# Patient Record
Sex: FEMALE | Race: WHITE | Employment: FULL TIME | URBAN - METROPOLITAN AREA
[De-identification: names, ages, dates, MRNs, and addresses within clinical notes are randomized per-mention and may not be internally consistent; named-entity substitution may affect disease eponyms.]

---

## 2018-07-12 LAB
HBSAG, EXTERNAL: NEGATIVE
HIV, EXTERNAL: NEGATIVE
RPR, EXTERNAL: NON REACTIVE
RUBELLA, EXTERNAL: NORMAL
TYPE, ABO & RH, EXTERNAL: NORMAL
URINALYSIS, EXTERNAL: NORMAL

## 2019-02-18 ENCOUNTER — HOSPITAL ENCOUNTER (OUTPATIENT)
Age: 34
Discharge: HOME | End: 2019-02-18
Attending: OBSTETRICS & GYNECOLOGY | Admitting: OBSTETRICS & GYNECOLOGY
Payer: COMMERCIAL

## 2019-02-18 VITALS
HEIGHT: 66 IN | DIASTOLIC BLOOD PRESSURE: 86 MMHG | HEART RATE: 92 BPM | SYSTOLIC BLOOD PRESSURE: 129 MMHG | WEIGHT: 195 LBS | BODY MASS INDEX: 31.34 KG/M2

## 2019-02-18 LAB
ALBUMIN SERPL-MCNC: 2.7 G/DL (ref 3.4–5)
ALBUMIN/GLOB SERPL: 0.8 {RATIO} (ref 0.8–1.7)
ALP SERPL-CCNC: 112 U/L (ref 45–117)
ALT SERPL-CCNC: 14 U/L (ref 13–56)
ANION GAP SERPL CALC-SCNC: 9 MMOL/L (ref 3–18)
APPEARANCE UR: ABNORMAL
AST SERPL-CCNC: 11 U/L (ref 15–37)
BILIRUB SERPL-MCNC: 0.3 MG/DL (ref 0.2–1)
BILIRUB UR QL: NEGATIVE
BUN SERPL-MCNC: 9 MG/DL (ref 7–18)
BUN/CREAT SERPL: 15 (ref 12–20)
CALCIUM SERPL-MCNC: 8.5 MG/DL (ref 8.5–10.1)
CHLORIDE SERPL-SCNC: 107 MMOL/L (ref 100–108)
CO2 SERPL-SCNC: 23 MMOL/L (ref 21–32)
COLOR UR: YELLOW
CREAT SERPL-MCNC: 0.61 MG/DL (ref 0.6–1.3)
ERYTHROCYTE [DISTWIDTH] IN BLOOD BY AUTOMATED COUNT: 14.5 % (ref 11.6–14.5)
GLOBULIN SER CALC-MCNC: 3.2 G/DL (ref 2–4)
GLUCOSE SERPL-MCNC: 69 MG/DL (ref 74–99)
GLUCOSE UR QL STRIP.AUTO: NEGATIVE MG/DL
HCT VFR BLD AUTO: 32.9 % (ref 35–45)
HGB BLD-MCNC: 10.7 G/DL (ref 12–16)
KETONES UR-MCNC: NEGATIVE MG/DL
LDH SERPL L TO P-CCNC: 132 U/L (ref 81–234)
LEUKOCYTE ESTERASE UR QL STRIP: NEGATIVE
MCH RBC QN AUTO: 29.3 PG (ref 24–34)
MCHC RBC AUTO-ENTMCNC: 32.5 G/DL (ref 31–37)
MCV RBC AUTO: 90.1 FL (ref 74–97)
NITRITE UR QL: NEGATIVE
PH UR: 6 [PH] (ref 5–9)
PLATELET # BLD AUTO: 191 K/UL (ref 135–420)
PMV BLD AUTO: 10.2 FL (ref 9.2–11.8)
POTASSIUM SERPL-SCNC: 4 MMOL/L (ref 3.5–5.5)
PROT SERPL-MCNC: 5.9 G/DL (ref 6.4–8.2)
PROT UR QL: NEGATIVE MG/DL
RBC # BLD AUTO: 3.65 M/UL (ref 4.2–5.3)
RBC # UR STRIP: ABNORMAL /UL
SERVICE CMNT-IMP: ABNORMAL
SODIUM SERPL-SCNC: 139 MMOL/L (ref 136–145)
SP GR UR: 1.02 (ref 1–1.02)
URATE SERPL-MCNC: 4.5 MG/DL (ref 2.6–7.2)
UROBILINOGEN UR QL: 0.2 EU/DL (ref 0.2–1)
WBC # BLD AUTO: 9.9 K/UL (ref 4.6–13.2)

## 2019-02-18 PROCEDURE — 74011250637 HC RX REV CODE- 250/637: Performed by: MIDWIFE

## 2019-02-18 PROCEDURE — 85027 COMPLETE CBC AUTOMATED: CPT

## 2019-02-18 PROCEDURE — 81003 URINALYSIS AUTO W/O SCOPE: CPT

## 2019-02-18 PROCEDURE — 99283 EMERGENCY DEPT VISIT LOW MDM: CPT

## 2019-02-18 PROCEDURE — 36415 COLL VENOUS BLD VENIPUNCTURE: CPT

## 2019-02-18 PROCEDURE — 80053 COMPREHEN METABOLIC PANEL: CPT

## 2019-02-18 PROCEDURE — 59025 FETAL NON-STRESS TEST: CPT

## 2019-02-18 PROCEDURE — 65270000029 HC RM PRIVATE

## 2019-02-18 PROCEDURE — 84550 ASSAY OF BLOOD/URIC ACID: CPT

## 2019-02-18 PROCEDURE — 83615 LACTATE (LD) (LDH) ENZYME: CPT

## 2019-02-18 RX ORDER — BUTALBITAL, ACETAMINOPHEN AND CAFFEINE 50; 325; 40 MG/1; MG/1; MG/1
2 TABLET ORAL ONCE
Status: COMPLETED | OUTPATIENT
Start: 2019-02-18 | End: 2019-02-18

## 2019-02-18 RX ORDER — OMEPRAZOLE 20 MG/1
20 TABLET, DELAYED RELEASE ORAL DAILY
COMMUNITY
End: 2019-02-27

## 2019-02-18 RX ADMIN — BUTALBITAL, ACETAMINOPHEN AND CAFFEINE 2 TABLET: 50; 325; 40 TABLET ORAL at 13:00

## 2019-02-18 NOTE — PROGRESS NOTES
1300 Care assumed. Report received from Tari Rankin Dr. 0027 TR to [facility]. - [facility] re: lab results, current bps, headache relieved by meds. Orders to discharge with pre-eclampsia symptom teaching. Educated pt re: fetal kick counts, sign & symptoms of active labor, symptoms to report to Md, and when to come back to hospital,  instructed to follow up in [address] office. Pt verbalized understanding.

## 2019-02-18 NOTE — DISCHARGE INSTRUCTIONS
Patient Education   Patient Education        Preeclampsia: Care Instructions  Overview    Preeclampsia occurs when a woman's blood pressure rises during pregnancy. Often with preeclampsia, you also have swelling in your legs, hands, and face. A test may show too much protein in your urine. Preeclampsia is also called toxemia. If preeclampsia is severe and not treated, it can lead to seizures (eclampsia) and damage to your liver or kidneys. Preeclampsia can prevent your baby from getting enough food and oxygen. This can cause a low birth weight or other problems. Your doctor will watch you closely to prevent these problems. He or she also may recommend that you reduce your activity. If your preeclampsia is a danger to your health or the health of your baby, your doctor may need to deliver your baby early. While preeclampsia is a concern, most women with preeclampsia have healthy babies. After a woman gives birth, preeclampsia usually goes away on its own. But symptoms may last a few weeks or more and can get worse after delivery. Rarely, symptoms of preeclampsia don't show up until days or even weeks after childbirth. Follow-up care is a key part of your treatment and safety. Be sure to make and go to all appointments, and call your doctor if you are having problems. It's also a good idea to know your test results and keep a list of the medicines you take. How can you care for yourself at home? · Take and record your blood pressure at home if your doctor tells you to. ? Learn the importance of the two measures of blood pressure (such as 120 over 80, or 120/80). The first number is the systolic pressure, which is the force of blood on the artery walls as the heart pumps. The second number is the diastolic pressure, which is the force of blood on the artery walls between heartbeats, when the heart is at rest. You have a choice of monitors to use. ? Manual monitor:  You pump up the cuff and use a stethoscope to listen for your pulse. ? Electronic monitor: The cuff inflates, and a gauge shows your pulse rate. ? To take your blood pressure:  ? Ask your doctor to check your blood pressure monitor to be sure that it is accurate and that the cuff fits you. Also ask your doctor to watch you to make sure that you are using it right. ? You should not eat, use tobacco products, or use medicine known to raise blood pressure (such as some nasal decongestant sprays) before you take your blood pressure. ? Avoid taking your blood pressure if you have just exercised or are nervous or upset. Rest at least 15 minutes before you take your blood pressure. · If your doctor advises, check the protein levels in your urine. Your doctor or nurse will show you how to do this. · Take your medicines exactly as prescribed. Call your doctor if you think you are having a problem with your medicine. · Do not smoke. Quitting smoking will help lower your blood pressure and improve your baby's growth and health. If you need help quitting, talk to your doctor about stop-smoking programs and medicines. These can increase your chances of quitting for good. · Eat a balanced and healthy diet that has lots of fruits and vegetables. · If your doctor advised bed rest, be sure to stay off your feet and rest as much as possible. ? Keep a phone, phone book, notepad, and pen near the bed where you can easily reach them. ? Gently stretch your legs every hour to maintain good blood flow. ? Have another family member pack snacks and lunch food in a cooler close to your bed. ? Use this time for activities that you usually cannot find time for, such as reading, craft projects, or letter writing. · You can keep track of your baby's health by noting the length of time it takes to count 10 movements (such as kicks, flutters, or rolls).  Feeling 10 movements in less than 1 hour is considered normal. Track your baby's movements once each day, and bring this record with you to each prenatal visit. When should you call for help? Call 911 anytime you think you may need emergency care. For example, call if:    · You passed out (lost consciousness).     · You have a seizure.    Call your doctor now or seek immediate medical care if:    · You have symptoms of preeclampsia, such as:  ? Sudden swelling of your face, hands, or feet. ? New vision problems (such as dimness or blurring). ? A severe headache.     · Your blood pressure is higher than it should be, or it rises suddenly.     · You have new nausea or vomiting.     · You think that you are in labor.     · You have pain in your belly or pelvis.    Watch closely for changes in your health, and be sure to contact your doctor if:    · You gain weight rapidly. Where can you learn more? Go to http://brett-reba.info/. Enter U387 in the search box to learn more about \"Preeclampsia: Care Instructions. \"  Current as of: 2018  Content Version: 11.9  © 8878-0666 [employer]. Care instructions adapted under license by [employer] (which disclaims liability or warranty for this information). If you have questions about a medical condition or this instruction, always ask your healthcare professional. [place] [address] any warranty or liability for your use of this information. Week 38 of Your Pregnancy: Care Instructions  Your Care Instructions    Believe it or not, your baby is almost here. You may have ideas about your baby's personality because of how much he or she moves. Or you may have noticed how he or she responds to sounds, warmth, cold, and light. You may even know what kind of music your baby likes. By now, you have a better idea of what to expect during delivery. You may have talked about your birth preferences with your doctor. But even if you want a vaginal birth, it is a good idea to learn about  births.   birth means that your baby is born through a cut (incision) in your lower belly. It is sometimes the best choice for the health of the baby and the mother. This care sheet can help you understand  births. It also gives you information about what to expect after your baby is born. And it helps you understand more about postpartum depression. Follow-up care is a key part of your treatment and safety. Be sure to make and go to all appointments, and call your doctor if you are having problems. It's also a good idea to know your test results and keep a list of the medicines you take. How can you care for yourself at home? Learn about  birth  · Most C-sections are unplanned. They are done because of problems that occur during labor. These problems might include:  ? Labor that slows or stops. ? High blood pressure or other problems for the mother. ? Signs of distress in the baby. These signs may include a very fast or slow heart rate. · Although most mothers and babies do well after , it is major surgery. It has more risks than a vaginal delivery. · In some cases, a planned  may be safer than a vaginal delivery. This may be the case if:  ? The mother has a health problem, such as a heart condition. ? The baby isn't in a head-down position for delivery. This is called a breech position. ? The uterus has scars from past surgeries. This could increase the chance of a tear in the uterus. ? There is a problem with the placenta. ? The mother has an infection, such as genital herpes, that could be spread to the baby. ? The mother is having twins or more. ? The baby weighs 9 to 10 pounds or more. · Because of the risks of , planned C-sections generally should be done only for medical reasons. And a planned  should be done at 39 weeks or later unless there is a medical reason to do it sooner.   Know what to expect after delivery, and plan for the first few weeks at home  · You, your baby, and your partner or  will get identification bands. Only people with matching bands can  the baby from the nursery. · You will learn how to feed, diaper, and bathe your baby. And you will learn how to care for the umbilical cord stump. If your baby will be circumcised, you will also learn how to care for that. · Ask people to wait to visit you until you are at home. And ask them to wash their hands before they touch your baby. · Make sure you have another adult in your home for at least 2 or 3 days after the birth. · During the first 2 weeks, limit when friends and family can visit. · Do not allow visitors who have colds or infections. Make sure all visitors are up to date with their vaccinations. Never let anyone smoke around your baby. · Try to nap when the baby naps. Be aware of postpartum depression  · \"Baby blues\" are common for the first 1 to 2 weeks after birth. You may cry or feel sad or irritable for no reason. · For some women, these feelings last longer and are more intense. This is called postpartum depression. · If your symptoms last for more than a few weeks or you feel very depressed, ask your doctor for help. · Postpartum depression can be treated. Support groups and counseling can help. Sometimes medicine can also help. Where can you learn more? Go to http://brett-reba.info/. Enter B044 in the search box to learn more about \"Week 38 of Your Pregnancy: Care Instructions. \"  Current as of: September 5, 2018  Content Version: 11.9  © 1514-1945 [employer]. Care instructions adapted under license by [employer] (which disclaims liability or warranty for this information). If you have questions about a medical condition or this instruction, always ask your healthcare professional. [address] any warranty or liability for your use of this information.

## 2019-02-21 PROBLEM — Z3A.38 38 WEEKS GESTATION OF PREGNANCY: Status: ACTIVE | Noted: 2019-02-21

## 2019-02-25 ENCOUNTER — ANESTHESIA (OUTPATIENT)
Dept: LABOR AND DELIVERY | Age: 34
End: 2019-02-25
Payer: COMMERCIAL

## 2019-02-25 ENCOUNTER — ANESTHESIA EVENT (OUTPATIENT)
Dept: LABOR AND DELIVERY | Age: 34
End: 2019-02-25
Payer: COMMERCIAL

## 2019-02-25 ENCOUNTER — HOSPITAL ENCOUNTER (INPATIENT)
Age: 34
LOS: 2 days | Discharge: HOME | End: 2019-02-27
Attending: OBSTETRICS & GYNECOLOGY | Admitting: OBSTETRICS & GYNECOLOGY
Payer: COMMERCIAL

## 2019-02-25 DIAGNOSIS — Z15.89 MTHFR MUTATION: ICD-10-CM

## 2019-02-25 DIAGNOSIS — Z86.718 HISTORY OF DVT (DEEP VEIN THROMBOSIS): Primary | ICD-10-CM

## 2019-02-25 PROBLEM — O99.119 COAGULATION DEFECT COMPLICATING PREGNANCY (HCC): Status: ACTIVE | Noted: 2019-02-25

## 2019-02-25 PROBLEM — D68.9 COAGULATION DEFECT COMPLICATING PREGNANCY (HCC): Status: ACTIVE | Noted: 2019-02-25

## 2019-02-25 PROBLEM — Z34.90 TERM PREGNANCY: Status: ACTIVE | Noted: 2019-02-25

## 2019-02-25 LAB
ABO + RH BLD: NORMAL
ALBUMIN SERPL-MCNC: 2.8 G/DL (ref 3.4–5)
ALBUMIN/GLOB SERPL: 0.8 {RATIO} (ref 0.8–1.7)
ALP SERPL-CCNC: 124 U/L (ref 45–117)
ALT SERPL-CCNC: 18 U/L (ref 13–56)
ANION GAP SERPL CALC-SCNC: 10 MMOL/L (ref 3–18)
AST SERPL-CCNC: 14 U/L (ref 15–37)
BASOPHILS # BLD: 0 K/UL (ref 0–0.1)
BASOPHILS NFR BLD: 0 % (ref 0–2)
BILIRUB DIRECT SERPL-MCNC: 0.1 MG/DL (ref 0–0.2)
BILIRUB SERPL-MCNC: 0.3 MG/DL (ref 0.2–1)
BLOOD GROUP ANTIBODIES SERPL: NORMAL
BUN SERPL-MCNC: 11 MG/DL (ref 7–18)
BUN/CREAT SERPL: 19 (ref 12–20)
CALCIUM SERPL-MCNC: 8.6 MG/DL (ref 8.5–10.1)
CHLORIDE SERPL-SCNC: 107 MMOL/L (ref 100–108)
CO2 SERPL-SCNC: 19 MMOL/L (ref 21–32)
CREAT SERPL-MCNC: 0.57 MG/DL (ref 0.6–1.3)
DIFFERENTIAL METHOD BLD: ABNORMAL
EOSINOPHIL # BLD: 0.1 K/UL (ref 0–0.4)
EOSINOPHIL NFR BLD: 1 % (ref 0–5)
ERYTHROCYTE [DISTWIDTH] IN BLOOD BY AUTOMATED COUNT: 14.5 % (ref 11.6–14.5)
GLOBULIN SER CALC-MCNC: 3.5 G/DL (ref 2–4)
GLUCOSE SERPL-MCNC: 86 MG/DL (ref 74–99)
HCT VFR BLD AUTO: 33.9 % (ref 35–45)
HGB BLD-MCNC: 10.9 G/DL (ref 12–16)
LYMPHOCYTES # BLD: 1.7 K/UL (ref 0.9–3.6)
LYMPHOCYTES NFR BLD: 14 % (ref 21–52)
MCH RBC QN AUTO: 28.8 PG (ref 24–34)
MCHC RBC AUTO-ENTMCNC: 32.2 G/DL (ref 31–37)
MCV RBC AUTO: 89.7 FL (ref 74–97)
MONOCYTES # BLD: 0.5 K/UL (ref 0.05–1.2)
MONOCYTES NFR BLD: 4 % (ref 3–10)
NEUTS SEG # BLD: 9.8 K/UL (ref 1.8–8)
NEUTS SEG NFR BLD: 81 % (ref 40–73)
PLATELET # BLD AUTO: 201 K/UL (ref 135–420)
PMV BLD AUTO: 10.5 FL (ref 9.2–11.8)
POTASSIUM SERPL-SCNC: 4.1 MMOL/L (ref 3.5–5.5)
PROT SERPL-MCNC: 6.3 G/DL (ref 6.4–8.2)
RBC # BLD AUTO: 3.78 M/UL (ref 4.2–5.3)
SODIUM SERPL-SCNC: 136 MMOL/L (ref 136–145)
SPECIMEN EXP DATE BLD: NORMAL
URATE SERPL-MCNC: 4.6 MG/DL (ref 2.6–7.2)
WBC # BLD AUTO: 12.2 K/UL (ref 4.6–13.2)

## 2019-02-25 PROCEDURE — 74011250636 HC RX REV CODE- 250/636: Performed by: OBSTETRICS & GYNECOLOGY

## 2019-02-25 PROCEDURE — 85025 COMPLETE CBC W/AUTO DIFF WBC: CPT

## 2019-02-25 PROCEDURE — 84550 ASSAY OF BLOOD/URIC ACID: CPT

## 2019-02-25 PROCEDURE — 74011250637 HC RX REV CODE- 250/637: Performed by: OBSTETRICS & GYNECOLOGY

## 2019-02-25 PROCEDURE — 00HU33Z INSERTION OF INFUSION DEVICE INTO SPINAL CANAL, PERCUTANEOUS APPROACH: ICD-10-PCS | Performed by: ANESTHESIOLOGY

## 2019-02-25 PROCEDURE — 74011000258 HC RX REV CODE- 258: Performed by: MIDWIFE

## 2019-02-25 PROCEDURE — 77030032490 HC SLV COMPR SCD KNE COVD -B

## 2019-02-25 PROCEDURE — 80048 BASIC METABOLIC PNL TOTAL CA: CPT

## 2019-02-25 PROCEDURE — 74011250636 HC RX REV CODE- 250/636: Performed by: ANESTHESIOLOGY

## 2019-02-25 PROCEDURE — 77030018749 HC HK AMNIO DISP DERY -A

## 2019-02-25 PROCEDURE — 77030012890

## 2019-02-25 PROCEDURE — 77030011943

## 2019-02-25 PROCEDURE — 65270000029 HC RM PRIVATE

## 2019-02-25 PROCEDURE — 74011250636 HC RX REV CODE- 250/636: Performed by: ADVANCED PRACTICE MIDWIFE

## 2019-02-25 PROCEDURE — 74011250636 HC RX REV CODE- 250/636

## 2019-02-25 PROCEDURE — 74011000250 HC RX REV CODE- 250

## 2019-02-25 PROCEDURE — 75410000003 HC RECOV DEL/VAG/CSECN EA 0.5 HR

## 2019-02-25 PROCEDURE — 75410000000 HC DELIVERY VAGINAL/SINGLE

## 2019-02-25 PROCEDURE — 3E033VJ INTRODUCTION OF OTHER HORMONE INTO PERIPHERAL VEIN, PERCUTANEOUS APPROACH: ICD-10-PCS | Performed by: OBSTETRICS & GYNECOLOGY

## 2019-02-25 PROCEDURE — 74011250636 HC RX REV CODE- 250/636: Performed by: MIDWIFE

## 2019-02-25 PROCEDURE — 77030007879 HC KT SPN EPDRL TELE -B: Performed by: ANESTHESIOLOGY

## 2019-02-25 PROCEDURE — 0KQM0ZZ REPAIR PERINEUM MUSCLE, OPEN APPROACH: ICD-10-PCS | Performed by: OBSTETRICS & GYNECOLOGY

## 2019-02-25 PROCEDURE — 10907ZC DRAINAGE OF AMNIOTIC FLUID, THERAPEUTIC FROM PRODUCTS OF CONCEPTION, VIA NATURAL OR ARTIFICIAL OPENING: ICD-10-PCS | Performed by: OBSTETRICS & GYNECOLOGY

## 2019-02-25 PROCEDURE — 80076 HEPATIC FUNCTION PANEL: CPT

## 2019-02-25 PROCEDURE — 75410000002 HC LABOR FEE PER 1 HR

## 2019-02-25 PROCEDURE — 86900 BLOOD TYPING SEROLOGIC ABO: CPT

## 2019-02-25 PROCEDURE — 3E0R3BZ INTRODUCTION OF ANESTHETIC AGENT INTO SPINAL CANAL, PERCUTANEOUS APPROACH: ICD-10-PCS | Performed by: ANESTHESIOLOGY

## 2019-02-25 RX ORDER — PHENYLEPHRINE HCL IN 0.9% NACL 1 MG/10 ML
80 SYRINGE (ML) INTRAVENOUS AS NEEDED
Status: COMPLETED | OUTPATIENT
Start: 2019-02-25 | End: 2019-02-25

## 2019-02-25 RX ORDER — SODIUM CHLORIDE 0.9 % (FLUSH) 0.9 %
5-40 SYRINGE (ML) INJECTION EVERY 8 HOURS
Status: DISCONTINUED | OUTPATIENT
Start: 2019-02-25 | End: 2019-02-25 | Stop reason: HOSPADM

## 2019-02-25 RX ORDER — MISOPROSTOL 100 UG/1
TABLET ORAL
Status: DISPENSED
Start: 2019-02-25 | End: 2019-02-25

## 2019-02-25 RX ORDER — OXYTOCIN/RINGER'S LACTATE 20/1000 ML
125 PLASTIC BAG, INJECTION (ML) INTRAVENOUS CONTINUOUS
Status: DISCONTINUED | OUTPATIENT
Start: 2019-02-25 | End: 2019-02-25 | Stop reason: HOSPADM

## 2019-02-25 RX ORDER — TERBUTALINE SULFATE 1 MG/ML
0.25 INJECTION SUBCUTANEOUS
Status: DISCONTINUED | OUTPATIENT
Start: 2019-02-25 | End: 2019-02-25 | Stop reason: HOSPADM

## 2019-02-25 RX ORDER — OXYCODONE AND ACETAMINOPHEN 5; 325 MG/1; MG/1
2 TABLET ORAL
Status: DISCONTINUED | OUTPATIENT
Start: 2019-02-25 | End: 2019-02-27 | Stop reason: HOSPADM

## 2019-02-25 RX ORDER — BUTORPHANOL TARTRATE 2 MG/ML
2 INJECTION INTRAMUSCULAR; INTRAVENOUS
Status: DISCONTINUED | OUTPATIENT
Start: 2019-02-25 | End: 2019-02-25 | Stop reason: HOSPADM

## 2019-02-25 RX ORDER — FENTANYL CITRATE 50 UG/ML
INJECTION, SOLUTION INTRAMUSCULAR; INTRAVENOUS AS NEEDED
Status: DISCONTINUED | OUTPATIENT
Start: 2019-02-25 | End: 2019-02-25 | Stop reason: HOSPADM

## 2019-02-25 RX ORDER — NALBUPHINE HYDROCHLORIDE 10 MG/ML
10 INJECTION, SOLUTION INTRAMUSCULAR; INTRAVENOUS; SUBCUTANEOUS
Status: DISCONTINUED | OUTPATIENT
Start: 2019-02-25 | End: 2019-02-25 | Stop reason: HOSPADM

## 2019-02-25 RX ORDER — HYDROMORPHONE HYDROCHLORIDE 1 MG/ML
1 INJECTION, SOLUTION INTRAMUSCULAR; INTRAVENOUS; SUBCUTANEOUS
Status: DISCONTINUED | OUTPATIENT
Start: 2019-02-25 | End: 2019-02-25 | Stop reason: HOSPADM

## 2019-02-25 RX ORDER — DIPHENHYDRAMINE HYDROCHLORIDE 50 MG/ML
25 INJECTION, SOLUTION INTRAMUSCULAR; INTRAVENOUS
Status: DISCONTINUED | OUTPATIENT
Start: 2019-02-25 | End: 2019-02-25 | Stop reason: HOSPADM

## 2019-02-25 RX ORDER — NALBUPHINE HYDROCHLORIDE 10 MG/ML
2.5 INJECTION, SOLUTION INTRAMUSCULAR; INTRAVENOUS; SUBCUTANEOUS
Status: DISCONTINUED | OUTPATIENT
Start: 2019-02-25 | End: 2019-02-25 | Stop reason: HOSPADM

## 2019-02-25 RX ORDER — FENTANYL/ROPIVACAINE/NS/PF 2MCG/ML-.1
PLASTIC BAG, INJECTION (ML) EPIDURAL
Status: COMPLETED
Start: 2019-02-25 | End: 2019-02-25

## 2019-02-25 RX ORDER — OXYTOCIN/0.9 % SODIUM CHLORIDE 30/500 ML
0-25 PLASTIC BAG, INJECTION (ML) INTRAVENOUS
Status: DISCONTINUED | OUTPATIENT
Start: 2019-02-25 | End: 2019-02-27 | Stop reason: HOSPADM

## 2019-02-25 RX ORDER — IBUPROFEN 400 MG/1
800 TABLET ORAL
Status: DISCONTINUED | OUTPATIENT
Start: 2019-02-25 | End: 2019-02-27 | Stop reason: HOSPADM

## 2019-02-25 RX ORDER — ZOLPIDEM TARTRATE 5 MG/1
5 TABLET ORAL
Status: DISCONTINUED | OUTPATIENT
Start: 2019-02-25 | End: 2019-02-27 | Stop reason: HOSPADM

## 2019-02-25 RX ORDER — SODIUM CHLORIDE, SODIUM LACTATE, POTASSIUM CHLORIDE, CALCIUM CHLORIDE 600; 310; 30; 20 MG/100ML; MG/100ML; MG/100ML; MG/100ML
125 INJECTION, SOLUTION INTRAVENOUS CONTINUOUS
Status: DISCONTINUED | OUTPATIENT
Start: 2019-02-25 | End: 2019-02-25 | Stop reason: HOSPADM

## 2019-02-25 RX ORDER — ACETAMINOPHEN 325 MG/1
650 TABLET ORAL
Status: DISCONTINUED | OUTPATIENT
Start: 2019-02-25 | End: 2019-02-27 | Stop reason: HOSPADM

## 2019-02-25 RX ORDER — ONDANSETRON 2 MG/ML
4 INJECTION INTRAMUSCULAR; INTRAVENOUS
Status: DISCONTINUED | OUTPATIENT
Start: 2019-02-25 | End: 2019-02-27 | Stop reason: HOSPADM

## 2019-02-25 RX ORDER — FENTANYL CITRATE 50 UG/ML
100 INJECTION, SOLUTION INTRAMUSCULAR; INTRAVENOUS ONCE
Status: DISCONTINUED | OUTPATIENT
Start: 2019-02-25 | End: 2019-02-25 | Stop reason: HOSPADM

## 2019-02-25 RX ORDER — MINERAL OIL
30 OIL (ML) ORAL AS NEEDED
Status: DISCONTINUED | OUTPATIENT
Start: 2019-02-25 | End: 2019-02-25 | Stop reason: HOSPADM

## 2019-02-25 RX ORDER — PROMETHAZINE HYDROCHLORIDE 25 MG/ML
25 INJECTION, SOLUTION INTRAMUSCULAR; INTRAVENOUS
Status: DISCONTINUED | OUTPATIENT
Start: 2019-02-25 | End: 2019-02-27 | Stop reason: HOSPADM

## 2019-02-25 RX ORDER — OXYTOCIN/RINGER'S LACTATE 20/1000 ML
999 PLASTIC BAG, INJECTION (ML) INTRAVENOUS ONCE
Status: COMPLETED | OUTPATIENT
Start: 2019-02-25 | End: 2019-02-25

## 2019-02-25 RX ORDER — LIDOCAINE HYDROCHLORIDE 10 MG/ML
20 INJECTION, SOLUTION EPIDURAL; INFILTRATION; INTRACAUDAL; PERINEURAL AS NEEDED
Status: DISCONTINUED | OUTPATIENT
Start: 2019-02-25 | End: 2019-02-25 | Stop reason: HOSPADM

## 2019-02-25 RX ORDER — LIDOCAINE HYDROCHLORIDE AND EPINEPHRINE 15; 5 MG/ML; UG/ML
INJECTION, SOLUTION EPIDURAL AS NEEDED
Status: DISCONTINUED | OUTPATIENT
Start: 2019-02-25 | End: 2019-02-25 | Stop reason: HOSPADM

## 2019-02-25 RX ORDER — ENOXAPARIN SODIUM 100 MG/ML
40 INJECTION SUBCUTANEOUS EVERY 24 HOURS
Status: DISCONTINUED | OUTPATIENT
Start: 2019-02-25 | End: 2019-02-27 | Stop reason: HOSPADM

## 2019-02-25 RX ORDER — METHYLERGONOVINE MALEATE 0.2 MG/ML
0.2 INJECTION INTRAVENOUS AS NEEDED
Status: DISCONTINUED | OUTPATIENT
Start: 2019-02-25 | End: 2019-02-25 | Stop reason: HOSPADM

## 2019-02-25 RX ORDER — FENTANYL CITRATE 50 UG/ML
INJECTION, SOLUTION INTRAMUSCULAR; INTRAVENOUS
Status: COMPLETED
Start: 2019-02-25 | End: 2019-02-25

## 2019-02-25 RX ORDER — LIDOCAINE HYDROCHLORIDE 10 MG/ML
INJECTION INFILTRATION; PERINEURAL
Status: DISPENSED
Start: 2019-02-25 | End: 2019-02-25

## 2019-02-25 RX ORDER — FENTANYL/ROPIVACAINE/NS/PF 2MCG/ML-.1
1-22 PLASTIC BAG, INJECTION (ML) EPIDURAL
Status: DISCONTINUED | OUTPATIENT
Start: 2019-02-25 | End: 2019-02-25 | Stop reason: HOSPADM

## 2019-02-25 RX ORDER — AMOXICILLIN 250 MG
1 CAPSULE ORAL
Status: DISCONTINUED | OUTPATIENT
Start: 2019-02-25 | End: 2019-02-27 | Stop reason: HOSPADM

## 2019-02-25 RX ORDER — NALOXONE HYDROCHLORIDE 0.4 MG/ML
0.2 INJECTION, SOLUTION INTRAMUSCULAR; INTRAVENOUS; SUBCUTANEOUS AS NEEDED
Status: DISCONTINUED | OUTPATIENT
Start: 2019-02-25 | End: 2019-02-25 | Stop reason: HOSPADM

## 2019-02-25 RX ORDER — ONDANSETRON 2 MG/ML
INJECTION INTRAMUSCULAR; INTRAVENOUS
Status: COMPLETED
Start: 2019-02-25 | End: 2019-02-25

## 2019-02-25 RX ORDER — SODIUM CHLORIDE 0.9 % (FLUSH) 0.9 %
5-40 SYRINGE (ML) INJECTION AS NEEDED
Status: DISCONTINUED | OUTPATIENT
Start: 2019-02-25 | End: 2019-02-25 | Stop reason: HOSPADM

## 2019-02-25 RX ADMIN — SODIUM CHLORIDE 5 MILLION UNITS: 900 INJECTION INTRAVENOUS at 05:57

## 2019-02-25 RX ADMIN — ONDANSETRON 4 MG: 2 INJECTION INTRAMUSCULAR; INTRAVENOUS at 11:32

## 2019-02-25 RX ADMIN — ACETAMINOPHEN 650 MG: 325 TABLET ORAL at 20:22

## 2019-02-25 RX ADMIN — Medication 1 MILLI-UNITS/MIN: at 06:25

## 2019-02-25 RX ADMIN — IBUPROFEN 800 MG: 400 TABLET, FILM COATED ORAL at 16:48

## 2019-02-25 RX ADMIN — ENOXAPARIN SODIUM 40 MG: 40 INJECTION SUBCUTANEOUS at 20:20

## 2019-02-25 RX ADMIN — ONDANSETRON 4 MG: 2 INJECTION INTRAMUSCULAR; INTRAVENOUS at 16:02

## 2019-02-25 RX ADMIN — LIDOCAINE HYDROCHLORIDE AND EPINEPHRINE 5 ML: 15; 5 INJECTION, SOLUTION EPIDURAL at 11:04

## 2019-02-25 RX ADMIN — FENTANYL CITRATE 100 MCG: 50 INJECTION, SOLUTION INTRAMUSCULAR; INTRAVENOUS at 11:05

## 2019-02-25 RX ADMIN — BUTORPHANOL TARTRATE 2 MG: 2 INJECTION, SOLUTION INTRAMUSCULAR; INTRAVENOUS at 07:28

## 2019-02-25 RX ADMIN — Medication 80 MCG: at 12:00

## 2019-02-25 RX ADMIN — ROPIVACAINE HYDROCHLORIDE 15 ML/HR: 10 INJECTION, SOLUTION EPIDURAL at 11:08

## 2019-02-25 RX ADMIN — Medication 19980 MILLI-UNITS/HR: at 14:20

## 2019-02-25 RX ADMIN — SODIUM CHLORIDE, SODIUM LACTATE, POTASSIUM CHLORIDE, AND CALCIUM CHLORIDE 125 ML/HR: 600; 310; 30; 20 INJECTION, SOLUTION INTRAVENOUS at 05:30

## 2019-02-25 RX ADMIN — PENICILLIN G POTASSIUM 2.5 MILLION UNITS: 20000000 POWDER, FOR SOLUTION INTRAVENOUS at 13:51

## 2019-02-25 RX ADMIN — PENICILLIN G POTASSIUM 2.5 MILLION UNITS: 20000000 POWDER, FOR SOLUTION INTRAVENOUS at 09:33

## 2019-02-25 RX ADMIN — SODIUM CHLORIDE, SODIUM LACTATE, POTASSIUM CHLORIDE, AND CALCIUM CHLORIDE 125 ML/HR: 600; 310; 30; 20 INJECTION, SOLUTION INTRAVENOUS at 09:32

## 2019-02-25 RX ADMIN — Medication 15 ML/HR: at 11:08

## 2019-02-25 NOTE — H&P
History & Physical    Name: Ramez Calderon MRN: [id number]  SSN: [id number]    YOB: 1985  Age: 30 y.o. Sex: female        Subjective:     Estimated Date of Delivery: 3/3/19  OB History      Para Term  AB Living    2 1 1     1    SAB TAB Ectopic Molar Multiple Live Births              1            Ms. Tanika Henriquez a 31 yo, , is admitted with pregnancy at 39w1d for induction of labor - FB placed yesterday and out at midnight. Prenatal course was complicated by factor 5 MTHFR. Please see prenatal records for details. ROS negative, small amount of bloody show reported. Denies headache, visual changes. No Known Allergies    Objective:     Vitals: There were no vitals filed for this visit. Physical Exam:  Patient without distress.   Heart: Regular rate and rhythm  Lung: clear to auscultation throughout lung fields, no wheezes, no rales, no rhonchi and normal respiratory effort  Abdomen: soft, nontender  Fundus: soft and non tender  Perineum: blood absent, amniotic fluid absent  Cervical Exam: 4 cm dilated    50% effaced    -3 station    Presenting Part: cephalic  Cervical Position: posterior  Consistency: Soft  Lower Extremities:  - Edema none   - Patellar Reflexes: 1+ bilaterally   - Clonus: absent  See above  Membranes:  Intact  Fetal Heart Rate & Contraction pattern: Baseline: 130 per minute  Variability: moderate  Accelerations: yes  Decelerations: none  Uterine contractions: regular, every 3-4 minutes    Prenatal Labs:   Lab Results   Component Value Date/Time    Rubella, External immune 2018    HBsAg, External negative 2018    HIV, External negative 2018    RPR, External non reactive 2018         Assessment/Plan:     Assessment: mild elevation of B/P - reports history therefore will get PIH labs for baseline, GBS positive, completed cervical ripening  Plan: Admit for Reassuring fetal status, Continue plan for vaginal delivery, proceed with continued IOL with pitocin. Group B Strep was positive, will treat prophylactically with penicillin.  SCDs, [address] labs    Signed By:  Michael Esquivel CNM     February 25, 2019

## 2019-02-25 NOTE — ANESTHESIA PREPROCEDURE EVALUATION
Anesthetic History No history of anesthetic complications Review of Systems / Medical History Patient summary reviewed, nursing notes reviewed and pertinent labs reviewed Pulmonary Within defined limits Neuro/Psych Psychiatric history Cardiovascular Hypertension Exercise tolerance: >4 METS 
  
GI/Hepatic/Renal 
Within defined limits Endo/Other Within defined limits Other Findings Physical Exam 
 
Airway Mallampati: II 
TM Distance: 4 - 6 cm Neck ROM: normal range of motion Mouth opening: Normal 
 
 Cardiovascular Dental 
No notable dental hx Pulmonary Abdominal 
GI exam deferred Other Findings Anesthetic Plan ASA: 2 Anesthesia type: epidural 
Risk and benefits fully explained to the patient including bleeding, headache, nerve damage, infection, nausea, back pain, and hemodynamic changes. Patient understands and agrees to the procedure. Post-op pain plan if not by surgeon: indwelling epidural catheter Anesthetic plan and risks discussed with: Patient

## 2019-02-25 NOTE — PROGRESS NOTES
Labor Progress Note  Patient seen, fetal heart rate and contraction pattern evaluated, patient examined. Physical Exam:  Cervical Exam:  4 cm dilated    50% effaced    -2 station    Presenting Part: cephalic  Membranes:  Artificial Rupture of Membranes;  Amniotic Fluid: large amount of clear fluid  Uterine Activity: Every 2 minutes  Fetal Heart Rate: Reactive    Assessment/Plan:  Labor  Progressing normally  Continue expectant management

## 2019-02-25 NOTE — PROGRESS NOTES
Labor Progress Note  Patient seen, fetal heart rate and contraction pattern evaluated, patient examined. No data found. Physical Exam:  Cervical Exam: 7cm/50/-1  Membranes:  Artificial Rupture of Membranes;  Amniotic Fluid: small amount of clear fluid  Uterine Activity: Every 2 minutes  Fetal Heart Rate: Reactive    Assessment/Plan:  Reassuring fetal status, Continue plan for vaginal delivery

## 2019-02-25 NOTE — L&D DELIVERY NOTE
Delivery Note    Obstetrician:  Mikel Soto MD    Pre-Delivery Diagnosis: Term pregnancy, Induced labor, Single fetus and Pregnancy complicated by: Hx of DVT    Post-Delivery Diagnosis: Living  infant and Male    Intrapartum Event: None    Procedure: Spontaneous vaginal delivery    Epidural: YES    Monitor:  Fetal Heart Tones - External and Uterine Contractions - External    Estimated Blood Loss: 350    Laceration(s):  2nd degree    Laceration(s) repair: YES    Fetal Description: dan    Specimens: DNA sample           Complications:  none              Mother and baby tolerated procedure well.

## 2019-02-25 NOTE — PROGRESS NOTES
0710 Bedside and Verbal shift change report given to HOLDEN Arguello (oncoming nurse) by Emma Bunch RN (offgoing nurse). Report included the following information SBAR, Kardex, Intake/Output, MAR and Recent Results. Patient assisted to right lateral position, US and TOCO adjusted. Patient complaining of rib pain due to fetal position, requests PRN stadol     0745 US and TOCO adjusted, patient assisted back to right lateral position. Denies pain or needs     6833 Patient resting with FOB present, denies needs     0920 Dr. Cirilo Leo at bedside SVE 4-5, AROM moderate amount of clear fluid, tao care provided, pad change, patient assisted to high fowlers, US and TOCO adjusted    1005 pt. Requesting epidural, LR bolusing    1033 Dr. Ron Kenyon paged for epidural     0 Dr. Ron Kenyon paged for epidural with call back, coming to unit     1054 Dr. Ron Kenyon  at bedside explaining epidural procedure, side effects, risks, benefits, and positioning. Patient verbalizes understanding. Time-out: 1058  Procedure start:1100  Catheter in, needle out:1105  Test dose: 1105  Fentanyl vial handed to MD at bedside. 1106  Loading dose:1106  Patient connected to pump:1108    1110 Patient assisted to semi fowlers, US TOCO adjusted     1148 Pt. Assisted to right lateral     1200 7/50/-1 SVE by Dr. Cirilo Leo. Patient assisted to left lateral position with peanut ball between knees    1205 Pitocin stopped     1215 Straight cath 125 mL of urine, Dr. Cirilo Leo at bedside, SVE 7-8/50/0    1218 Pitocin restarted by verbal orders from Dr. Cirilo Leo at [address]    [phone] patient assisted to hands and knees position, US and TOCO adjusted      1300 SVE by Dr. Cirilo Leo unchanged, Patient assisted to high fowlers, US and TOCO adjusted      1400 Patient complaining of rectal pressure, SVE anterior lip/100/+1.  Dr. Cirilo Leo notified    5919 Dr. Cirilo Leo at bedside, first push - RN and MD remain at bedside continuously evaluating FHT    [id number] spontaneous vaginal delivery of a viable male infant, spontaneous cry noted with tactile stimulation and bulb suctioning, Infant dried and placed skin to skin on mothers abdomen. Cord clamped by MD and cut by FOB. Infant then placed skin to skin on mothers chest. 8/9 apgars     1424 Spontaneous delivery of a normal looking and intact placenta. Discarded, , 2nd degree repair completed by Dr. Carlos Alberto Gaston     [phone] [id number] Infant assisted to [address] [facility] Patient ambulated up to bathroom with minimal assistance. Legs steady, no weakness or dizziness. Daisha care and daisha care education provided. Pad change, ice pack and dermaplast applied. Patient ambulated to wheelchair for transfer to [facility]    1715 TRANSFER - OUT REPORT:    Verbal report given to MASSIEL Lopez RN(name) on Viktoriya Short  being transferred to postpartum(unit) for routine progression of care       Report consisted of patients Situation, Background, Assessment and   Recommendations(SBAR). Information from the following report(s) SBAR, Kardex, Intake/Output, MAR and Recent Results was reviewed with the receiving nurse. Lines:   Peripheral IV 02/25/19 Right Hand (Active)   Site Assessment Clean, dry, & intact 2/25/2019  7:15 AM   Phlebitis Assessment 0 2/25/2019  7:15 AM   Infiltration Assessment 0 2/25/2019  7:15 AM   Dressing Status Clean, dry, & intact 2/25/2019  7:15 AM   Dressing Type Tape;Transparent 2/25/2019  7:15 AM   Hub Color/Line Status Pink; Infusing 2/25/2019  7:15 AM   Action Taken Open ports on tubing capped 2/25/2019  7:15 AM   Alcohol Cap Used Yes 2/25/2019  7:15 AM        Opportunity for questions and clarification was provided.       Patient transported with:   Registered Nurse

## 2019-02-25 NOTE — ANESTHESIA PROCEDURE NOTES
Epidural Block Start time: 2/25/2019 11:00 AM 
End time: 2/25/2019 11:06 AM 
Performed by: Qiana Max MD 
Authorized by: Qiana Max MD  
 
Pre-Procedure Indication: at surgeon's request, post-op pain management, procedure for pain and labor epidural   
Preanesthetic Checklist: patient identified, risks and benefits discussed, anesthesia consent, site marked, patient being monitored, timeout performed and anesthesia consent Epidural:  
Patient position:  Seated Prep region:  Lumbar Prep: Chlorhexidine Location:  L3-4 Needle and Epidural Catheter:  
Needle Type:  Tuohy Needle Gauge:  17 G Injection Technique:  Loss of resistance using saline Attempts:  1 Catheter Size:  20 G Catheter at Skin Depth (cm):  10 Depth in Epidural Space (cm):  5 Events: no blood with aspiration, no cerebrospinal fluid with aspiration, no paresthesia and negative aspiration test   
Test Dose:  Negative Assessment:  
Catheter Secured:  Tegaderm and tape Insertion:  Uncomplicated Patient tolerance:  Patient tolerated the procedure well with no immediate complications

## 2019-02-25 NOTE — PROGRESS NOTES
This CNM made aware by RN that patient's DVT prophylaxis medication not ordered. Reviewed patient chart and consulted with Dr. Stephen Brooke on patient dose regimen per [facility] recommendations. Patient is to restart Lovenox 40 mg SQ daily 6 hours after delivery and continue prophylaxis treatment for 6 weeks postpartum after discharge. Lovenox and compression stockings ordered. Lovenox to start at 2100 this evening. Continue to monitor for s/sx of DVT postpartum.

## 2019-02-25 NOTE — PROGRESS NOTES
0964 - Patient arrived to unit via ambulation as a  at 39.1 weeks for scheduled induction. Patient denies LOF or vaginal bleeding, and reports intermittent contractions. Patient states that she had a suresh bulb inserted in the office yesterday that fell out at 0000. Patient reports histroy of Factor 5 MTHFR and last dose of Heparin was at 0330. Patient taken to LR 6 and given gown to change. 0503 - EFM and TOCO applied. Positive FM. Assessment complete. 0530 - PAPO Magallon at bedside. POC discussed. 0540 - SVE /-3  0557 - First dose of PCN started  5655 - Pitocin started a 1 mu.  0709 - SEDS applied  0710 - Bedside and Verbal shift change report given to HOLDEN Morales RN (oncoming nurse) by Carmita Arora RN (offgoing nurse). Report included the following information SBAR, Kardex, Intake/Output, MAR and Recent Results.

## 2019-02-26 PROBLEM — O99.119 COAGULATION DEFECT COMPLICATING PREGNANCY (HCC): Status: RESOLVED | Noted: 2019-02-25 | Resolved: 2019-02-26

## 2019-02-26 PROBLEM — Z15.89 MTHFR MUTATION: Status: ACTIVE | Noted: 2019-02-26

## 2019-02-26 PROBLEM — D64.9 ANEMIA: Status: ACTIVE | Noted: 2019-02-26

## 2019-02-26 PROBLEM — D68.9 COAGULATION DEFECT COMPLICATING PREGNANCY (HCC): Status: RESOLVED | Noted: 2019-02-25 | Resolved: 2019-02-26

## 2019-02-26 PROBLEM — Z86.718 HISTORY OF DVT (DEEP VEIN THROMBOSIS): Status: ACTIVE | Noted: 2019-02-26

## 2019-02-26 PROBLEM — Z3A.38 38 WEEKS GESTATION OF PREGNANCY: Status: RESOLVED | Noted: 2019-02-21 | Resolved: 2019-02-26

## 2019-02-26 PROBLEM — K21.9 GASTROESOPHAGEAL REFLUX DISEASE: Status: ACTIVE | Noted: 2018-04-25

## 2019-02-26 PROBLEM — Z34.90 TERM PREGNANCY: Status: RESOLVED | Noted: 2019-02-25 | Resolved: 2019-02-26

## 2019-02-26 LAB
HCT VFR BLD AUTO: 30.3 % (ref 35–45)
HGB BLD-MCNC: 9.6 G/DL (ref 12–16)

## 2019-02-26 PROCEDURE — 65270000029 HC RM PRIVATE

## 2019-02-26 PROCEDURE — 74011250637 HC RX REV CODE- 250/637: Performed by: OBSTETRICS & GYNECOLOGY

## 2019-02-26 PROCEDURE — 74011250636 HC RX REV CODE- 250/636: Performed by: ADVANCED PRACTICE MIDWIFE

## 2019-02-26 PROCEDURE — 74011250637 HC RX REV CODE- 250/637: Performed by: ADVANCED PRACTICE MIDWIFE

## 2019-02-26 PROCEDURE — 74011250636 HC RX REV CODE- 250/636: Performed by: OBSTETRICS & GYNECOLOGY

## 2019-02-26 PROCEDURE — 85018 HEMOGLOBIN: CPT

## 2019-02-26 PROCEDURE — 85014 HEMATOCRIT: CPT

## 2019-02-26 PROCEDURE — 36415 COLL VENOUS BLD VENIPUNCTURE: CPT

## 2019-02-26 RX ORDER — IBUPROFEN 800 MG/1
800 TABLET ORAL
Qty: 30 TAB | Refills: 0 | Status: SHIPPED | OUTPATIENT
Start: 2019-02-26

## 2019-02-26 RX ORDER — DOCUSATE SODIUM 100 MG/1
100 CAPSULE, LIQUID FILLED ORAL
Qty: 60 CAP | Refills: 2 | Status: SHIPPED | OUTPATIENT
Start: 2019-02-26 | End: 2019-05-27

## 2019-02-26 RX ORDER — CALCIUM CARBONATE 200(500)MG
200 TABLET,CHEWABLE ORAL
Status: DISCONTINUED | OUTPATIENT
Start: 2019-02-26 | End: 2019-02-27 | Stop reason: HOSPADM

## 2019-02-26 RX ORDER — OMEPRAZOLE 20 MG/1
20 CAPSULE, DELAYED RELEASE ORAL DAILY
Status: DISCONTINUED | OUTPATIENT
Start: 2019-02-26 | End: 2019-02-27 | Stop reason: HOSPADM

## 2019-02-26 RX ORDER — LANOLIN ALCOHOL/MO/W.PET/CERES
325 CREAM (GRAM) TOPICAL
Qty: 30 TAB | Refills: 2 | Status: SHIPPED | OUTPATIENT
Start: 2019-02-26 | End: 2019-05-27

## 2019-02-26 RX ORDER — ENOXAPARIN SODIUM 100 MG/ML
40 INJECTION SUBCUTANEOUS EVERY 24 HOURS
Qty: 42 SYRINGE | Refills: 0 | Status: ON HOLD | OUTPATIENT
Start: 2019-02-26 | End: 2022-03-11 | Stop reason: SDUPTHER

## 2019-02-26 RX ADMIN — OMEPRAZOLE 20 MG: 20 CAPSULE, DELAYED RELEASE ORAL at 18:26

## 2019-02-26 RX ADMIN — IBUPROFEN 800 MG: 400 TABLET, FILM COATED ORAL at 09:52

## 2019-02-26 RX ADMIN — ENOXAPARIN SODIUM 40 MG: 40 INJECTION SUBCUTANEOUS at 20:12

## 2019-02-26 RX ADMIN — IBUPROFEN 800 MG: 400 TABLET, FILM COATED ORAL at 02:07

## 2019-02-26 RX ADMIN — IBUPROFEN 800 MG: 400 TABLET, FILM COATED ORAL at 20:12

## 2019-02-26 RX ADMIN — ONDANSETRON 4 MG: 2 INJECTION INTRAMUSCULAR; INTRAVENOUS at 02:14

## 2019-02-26 RX ADMIN — ANTACID TABLETS 200 MG: 500 TABLET, CHEWABLE ORAL at 18:26

## 2019-02-26 NOTE — DISCHARGE INSTRUCTIONS
POST DELIVERY DISCHARGE INSTRUCTIONS    Name: Eliana Magaña  YOB: 1985  Primary Diagnosis: Active Problems:    Postpartum care following vaginal delivery (2/26/2019)      Anemia (2/26/2019)      History of DVT (deep vein thrombosis) (2/26/2019)      MTHFR mutation ([address] (2/26/2019)        General:     Diet/Diet Restrictions:  Eight 8-ounce glasses of fluid daily (water, juices); avoid excessive caffeine intake. Meals/snacks as desired which are high in fiber and carbohydrates and low in fat and cholesterol. Physical Activity / Restrictions / Safety:     Avoid heavy lifting, no more than the baby alone (not the baby in the car seat). Avoid intercourse until you are seen at your postpartum visit. No douching or tampon use. Check with obstetrician before starting or resuming an exercise program.         Discharge Instructions/Special Treatment/Home Care Needs:     Continue prenatal vitamins. Continue to use squirt bottle with warm water on your perineum after each bathroom use until bleeding stops. Call your doctor for the following:     Fever over 101 degrees by mouth. Vaginal bleeding that soaks two pads per hour for more than one hour. Red streaks or increased swelling of legs, painful red streaks on your breast.  If you feel extremely anxious or overwhelmed. If you have thoughts of harming yourself and/or your baby. Pain Management:     Pain Management:   Take Acetaminophen (Tylenol) or Ibuprofen (Advil, Motrin), as directed for pain. Use a warm Sitz bath 3 times daily to relieve perineal or hemorrhoidal discomfort. For hemorrhoidal discomfort, use Tucks and Anusol cream as needed and directed. Follow-Up Care:     Appointment with MD:   Follow-up Appointments   Procedures    FOLLOW UP VISIT Appointment in: 6 Weeks 1. Follow up with Dr. Liam El for your postpartum visit in 6 weeks. 1. Follow up with Dr. Liam El for your postpartum visit in 6 weeks.      Standing Status: Standing     Number of Occurrences:   1     Order Specific Question:   Appointment in     Answer:   6 Weeks     Telephone number: [phone]      Signed By: Melisa Sinclair CNM

## 2019-02-26 NOTE — ANESTHESIA POSTPROCEDURE EVALUATION
2/26/2019 
4:21 PM 
 
Laboring Epidural Follow-up Note Referring physician: Kaylee Crews MD  
Patient status post vaginal delivery with labor epidural. 
 
 
Visit Vitals /81 Pulse 71 Temp 36.8 °C (98.2 °F) Resp 18 Ht 5' 6\" (1.676 m) Wt 88.5 kg (195 lb) SpO2 100% Breastfeeding? Unknown BMI 31.47 kg/m² Patient ambulating and voiding without difficulty. Patient denies headache. Patient denies backache. Keith Puente, CRNA

## 2019-02-26 NOTE — LACTATION NOTE
Infant latching and nursing well.  Discussed nutritive vs non nutritive sucking as mom had questions about hunger cues and amount of sucking infant does on breast.

## 2019-02-26 NOTE — DISCHARGE SUMMARY
Progress Note    Patient: Marion Duvall MRN: [id number]     YOB: 1985  Age: 30 y.o. Subjective:     Postpartum Day: 1    The patient is feeling well. Pain is  well controlled with current medications. Baby is feeding via breast without difficulty. Urinary output is adequate. Patient is tolerating a regular diet. Objective:      Patient Vitals for the past 8 hrs:   BP Temp Pulse Resp SpO2   19 0845 125/81 98.2 °F (36.8 °C) 71 18 100 %     General:    alert, cooperative, no distress   Lochia:  small rubra, appropriate   Uterine Fundus:   firm @ umbilicus    Perineum:  well approximated, healing   DVT Evaluation:  No evidence of DVT seen on physical exam.  Negative Darwin's sign. No cords or calf tenderness. Calf/Ankle with 1+edema is present. L>R  Ankush anglin on     Lab/Data Review:  Recent Results (from the past 24 hour(s))   HEMOGLOBIN    Collection Time: 19  4:22 AM   Result Value Ref Range    HGB 9.6 (L) 12.0 - 16.0 g/dL   HEMATOCRIT    Collection Time: 19  4:22 AM   Result Value Ref Range    HCT 30.3 (L) 35.0 - 45.0 %     Lab results reviewed. For significant abnormal values and values requiring intervention, see assessment and plan. Assessment:     Delivery:     Plan:     Doing well postpartum vaginal delivery. Hgb 9.6 this am. Patient is asymptomatic for si/sx of anemia. Will initiate ferrous sulfate 325 mg qD. Patient requesting baby circumcision. The risks and benefits of the circumcision  procedure and anesthesia including: bleeding, infection, variability of cosmetic results were discussed at length with the mother. She is aware that future repeat procedures may be necessary. She gives informed consent to proceed as noted and her questions are answered. Will continue current postpartum care and education. Encouraged hydration, nutrition and ambulation. Plan for DC home tomorrow if patient remains stable overnight. Follow-up in office in 6 weeks. Call prn. Current Discharge Medication List      START taking these medications    Details   enoxaparin (LOVENOX) 40 mg/0.4 mL 0.4 mL by SubCUTAneous route every twenty-four (24) hours. Qty: 43 Syringe, Refills: 0    Associated Diagnoses: History of DVT (deep vein thrombosis); MTHFR mutation ([facility])      ibuprofen (MOTRIN) 800 mg tablet Take 1 Tab by mouth every eight (8) hours as needed. Qty: 30 Tab, Refills: 0      ferrous sulfate 325 mg (65 mg iron) tablet Take 1 Tab by mouth Daily (before breakfast) for 90 days. Qty: 30 Tab, Refills: 2      docusate sodium (COLACE) 100 mg capsule Take 1 Cap by mouth two (2) times daily as needed for Constipation for up to 90 days. Qty: 60 Cap, Refills: 2         STOP taking these medications       heparin sodium,porcine/PF (HEPARIN, PORCINE, PF, INJECTION) Comments:   Reason for Stopping:         omeprazole (PRILOSEC OTC) 20 mg tablet Comments:   Reason for Stopping:               Signed By: Chele Honeycutt CNM     2019       Obstetrical Discharge Summary     Name: Charlott Cheadle MRN: [id number]  SSN: [id number]    YOB: 1985  Age: 30 y.o. Sex: female      Admit Date: 2019    Discharge Date: 2019    Admitting Physician: Doreen Gavin MD     Attending Physician:  Genaro Webb MD     Discharge Diagnoses:   Information for the patient's :  Josi Leger [[id number]]   Delivery of a 3.575 kg male infant via Vaginal, Spontaneous on 2019 at 2:20 PM  by . Apgars were 8 and 9.        Additional Diagnoses:   Problem List as of 2019 Date Reviewed: 2019          Codes Class Noted - Resolved    Postpartum care following vaginal delivery ICD-10-CM: Z39.2  ICD-9-CM: V24.2  2019 - Present        Anemia ICD-10-CM: D64.9  ICD-9-CM: 285.9  2019 - Present        History of DVT (deep vein thrombosis) ICD-10-CM: Z86.718  ICD-9-CM: V12.51  2019 - Present        [facility] ICD-10-CM: E72.12  ICD-9-CM: 270.4 2/26/2019 - Present        Gastroesophageal reflux disease ICD-10-CM: K21.9  ICD-9-CM: 530.81  4/25/2018 - Present        RESOLVED: Coagulation defect complicating [facility] ICD-10-CM: O99.119, D68.9  ICD-9-CM: 649.30, 286.9  2/25/2019 - 2/26/2019        RESOLVED: Term pregnancy ICD-10-CM: Z34.80  ICD-9-CM: V22.1  2/25/2019 - 2/26/2019        RESOLVED: 38 weeks gestation of pregnancy ICD-10-CM: Z3A.38  ICD-9-CM: V22.2  2/21/2019 - 2/26/2019              Lab Results   Component Value Date/Time    Rubella, External immune 07/12/2018     Recent Labs     02/26/19  0422   HGB 9.6*       Hospital Course: Postpartum course was complicated by acute blood loss anemia and history of DVT,MTHR gene, which added 0 days to the patient's length of stay. Patient started on Lovenox 40 mg SQ postpartum with no complications. Iron supplement qD to be added to DC medications    Patient Instructions:   Current Discharge Medication List      START taking these medications    Details   enoxaparin (LOVENOX) 40 mg/0.4 mL 0.4 mL by SubCUTAneous route every twenty-four (24) hours. Qty: 43 Syringe, Refills: 0    Associated Diagnoses: History of DVT (deep vein thrombosis); MTHFR mutation ([facility])      ibuprofen (MOTRIN) 800 mg tablet Take 1 Tab by mouth every eight (8) hours as needed. Qty: 30 Tab, Refills: 0      ferrous sulfate 325 mg (65 mg iron) tablet Take 1 Tab by mouth Daily (before breakfast) for 90 days. Qty: 30 Tab, Refills: 2      docusate sodium (COLACE) 100 mg capsule Take 1 Cap by mouth two (2) times daily as needed for Constipation for up to 90 days. Qty: 60 Cap, Refills: 2         STOP taking these medications       heparin sodium,porcine/PF (HEPARIN, PORCINE, PF, INJECTION) Comments:   Reason for Stopping:         omeprazole (PRILOSEC OTC) 20 mg tablet Comments:   Reason for Stopping:               Reference my discharge instructions. Follow-up Appointments   Procedures    FOLLOW UP VISIT Appointment in: 6 Weeks 1. Follow up with Dr. Kuldip Barker for your postpartum visit in 6 weeks. 1. Follow up with Dr. Kuldip Barker for your postpartum visit in 6 weeks.      Standing Status:   Standing     Number of Occurrences:   1     Order Specific Question:   Appointment in     Answer:   6 Weeks        Signed By:  Osmar Dan CNM     February 26, 2019

## 2019-02-26 NOTE — PROGRESS NOTES
Bedside and Verbal shift change report given to JOANNA Walsh RN (oncoming nurse) by Rolf Garnica RN (offgoing nurse). Report given with Indira BRAVO and MAR.

## 2019-02-26 NOTE — PROGRESS NOTES
Bedside and Verbal shift change report given to JOANNA Ledezma RN (oncoming nurse) by MASSIEL Lopez RN (offgoing nurse). Report included the following information SBAR, Kardex, Intake/Output, MAR and Recent Results. 2030- Pt up to void, 400ml per patient. No assistance needed. Pitocin finished, IV saline locked. Lovenox administered by patient to self and tylenol given for moderate pain. ADDI hose on. Updated on POC. No other needs. 0700- Bedside and Verbal shift change report given to Jen Prieto RN (oncoming nurse) by Chepe Rodriguez. Jad Gan, ALISHA (offgoing nurse). Report included the following information SBAR, Kardex, Intake/Output, MAR and Recent Results.

## 2019-02-26 NOTE — PROGRESS NOTES
0700 Bedside and Verbal shift change report given to Odilon Patricia RN   (oncoming nurse) by Rafaela Barker RN (offgoing nurse). Report included the following information SBAR, Intake/Output, MAR and Recent Results. 1900 Bedside and Verbal shift change report given to  AKHIL Peterson RN (oncoming nurse) by Odilon Patricia RN   (offgoing nurse). Report included the following information SBAR, Intake/Output, MAR and Recent Results.

## 2019-02-27 VITALS
HEART RATE: 75 BPM | RESPIRATION RATE: 17 BRPM | BODY MASS INDEX: 31.34 KG/M2 | WEIGHT: 195 LBS | OXYGEN SATURATION: 100 % | SYSTOLIC BLOOD PRESSURE: 135 MMHG | DIASTOLIC BLOOD PRESSURE: 87 MMHG | HEIGHT: 66 IN | TEMPERATURE: 98.1 F

## 2019-02-27 PROCEDURE — 74011250637 HC RX REV CODE- 250/637: Performed by: OBSTETRICS & GYNECOLOGY

## 2019-02-27 PROCEDURE — 74011250637 HC RX REV CODE- 250/637: Performed by: ADVANCED PRACTICE MIDWIFE

## 2019-02-27 RX ADMIN — IBUPROFEN 800 MG: 400 TABLET, FILM COATED ORAL at 05:03

## 2019-02-27 RX ADMIN — OMEPRAZOLE 20 MG: 20 CAPSULE, DELAYED RELEASE ORAL at 09:25

## 2019-02-27 NOTE — LACTATION NOTE
Breastfeeding discharge teaching completed to include feeding on demand, foremilk and hindmilk importance, engorgement, mastitis, clogged ducts, pumping, breastmilk storage, and returning to work. Information given about unit and office phone numbers and encouraged mom to reach out if concerns arise, but that [facility] would be calling her in the next few days to follow up on breastfeeding. Mom verbalized understanding and no questions at this time.

## 2022-01-24 ENCOUNTER — HOSPITAL ENCOUNTER (OUTPATIENT)
Dept: PREADMISSION TESTING | Age: 37
Discharge: HOME | End: 2022-01-24
Payer: COMMERCIAL

## 2022-01-24 LAB
BASOPHILS # BLD: 0.1 K/UL (ref 0–0.1)
BASOPHILS NFR BLD: 1 % (ref 0–2)
DIFFERENTIAL METHOD BLD: NORMAL
EOSINOPHIL # BLD: 0.1 K/UL (ref 0–0.4)
EOSINOPHIL NFR BLD: 2 % (ref 0–5)
ERYTHROCYTE [DISTWIDTH] IN BLOOD BY AUTOMATED COUNT: 12.2 % (ref 11.6–14.5)
HCT VFR BLD AUTO: 39.4 % (ref 35–45)
HGB BLD-MCNC: 13 G/DL (ref 12–16)
IMM GRANULOCYTES # BLD AUTO: 0 K/UL (ref 0–0.04)
IMM GRANULOCYTES NFR BLD AUTO: 0 % (ref 0–0.5)
LYMPHOCYTES # BLD: 1.4 K/UL (ref 0.9–3.6)
LYMPHOCYTES NFR BLD: 25 % (ref 21–52)
MCH RBC QN AUTO: 31 PG (ref 24–34)
MCHC RBC AUTO-ENTMCNC: 33 G/DL (ref 31–37)
MCV RBC AUTO: 93.8 FL (ref 78–100)
MONOCYTES # BLD: 0.5 K/UL (ref 0.05–1.2)
MONOCYTES NFR BLD: 9 % (ref 3–10)
NEUTS SEG # BLD: 3.7 K/UL (ref 1.8–8)
NEUTS SEG NFR BLD: 64 % (ref 40–73)
NRBC # BLD: 0 K/UL (ref 0–0.01)
NRBC BLD-RTO: 0 PER 100 WBC
PLATELET # BLD AUTO: 190 K/UL (ref 135–420)
PMV BLD AUTO: 9.7 FL (ref 9.2–11.8)
RBC # BLD AUTO: 4.2 M/UL (ref 4.2–5.3)
WBC # BLD AUTO: 5.8 K/UL (ref 4.6–13.2)

## 2022-01-24 PROCEDURE — 36415 COLL VENOUS BLD VENIPUNCTURE: CPT

## 2022-01-24 PROCEDURE — 85025 COMPLETE CBC W/AUTO DIFF WBC: CPT

## 2022-01-25 ENCOUNTER — HOSPITAL ENCOUNTER (OUTPATIENT)
Dept: PREADMISSION TESTING | Age: 37
Discharge: HOME | End: 2022-01-25
Payer: COMMERCIAL

## 2022-01-25 PROCEDURE — U0003 INFECTIOUS AGENT DETECTION BY NUCLEIC ACID (DNA OR RNA); SEVERE ACUTE RESPIRATORY SYNDROME CORONAVIRUS 2 (SARS-COV-2) (CORONAVIRUS DISEASE [COVID-19]), AMPLIFIED PROBE TECHNIQUE, MAKING USE OF HIGH THROUGHPUT TECHNOLOGIES AS DESCRIBED BY CMS-2020-01-R: HCPCS

## 2022-01-26 ENCOUNTER — HOSPITAL ENCOUNTER (OUTPATIENT)
Dept: PREADMISSION TESTING | Age: 37
Discharge: HOME | End: 2022-01-26

## 2022-01-26 VITALS — WEIGHT: 170 LBS | HEIGHT: 66 IN | BODY MASS INDEX: 27.32 KG/M2

## 2022-01-26 LAB — SARS-COV-2, NAA: DETECTED

## 2022-01-26 RX ORDER — RABEPRAZOLE SODIUM 20 MG/1
20 TABLET, DELAYED RELEASE ORAL DAILY
COMMUNITY

## 2022-01-26 RX ORDER — SERTRALINE HYDROCHLORIDE 50 MG/1
50 TABLET, FILM COATED ORAL DAILY
COMMUNITY

## 2022-01-26 RX ORDER — SODIUM CHLORIDE, SODIUM LACTATE, POTASSIUM CHLORIDE, CALCIUM CHLORIDE 600; 310; 30; 20 MG/100ML; MG/100ML; MG/100ML; MG/100ML
125 INJECTION, SOLUTION INTRAVENOUS CONTINUOUS
Status: CANCELLED | OUTPATIENT
Start: 2022-01-31

## 2022-01-26 RX ORDER — CEFAZOLIN SODIUM/WATER 2 G/20 ML
2 SYRINGE (ML) INTRAVENOUS ONCE
Status: CANCELLED | OUTPATIENT
Start: 2022-01-31 | End: 2022-01-31

## 2022-01-26 NOTE — PERIOP NOTES
Patient advised to wear mask when entering any of the buildings. They will still need to be tested and quarantine prior to procedure. Patient does not meet criteria for special pop. No hx of sleep apnea or previous screening. Denies hx of MH. PCP is aware of surgery. CHG skin care kit given and process reviewed. Not participating in any research study or clinical trials. Patient instructed when coming in for surgery, do not use any lotions, creams or deodorant. Also to remove all jewelry, hairpins, make-up and nail polish. Instructed to wear something loose fitting and comfortable, easy to take off, easy to put on. Came 1-25 for Covid test. Results pending.

## 2022-01-27 NOTE — PERIOP NOTES
Covid Positive- notified Zaida at [facility] office. Please notify pt. Also requested PCP last office note.

## 2022-02-23 ENCOUNTER — ANESTHESIA EVENT (OUTPATIENT)
Dept: SURGERY | Age: 37
End: 2022-02-23
Payer: COMMERCIAL

## 2022-02-25 ENCOUNTER — ANESTHESIA (OUTPATIENT)
Dept: SURGERY | Age: 37
End: 2022-02-25
Payer: COMMERCIAL

## 2022-02-25 ENCOUNTER — HOSPITAL ENCOUNTER (OUTPATIENT)
Age: 37
Setting detail: OUTPATIENT SURGERY
Discharge: HOME | End: 2022-02-25
Attending: OBSTETRICS & GYNECOLOGY | Admitting: OBSTETRICS & GYNECOLOGY
Payer: COMMERCIAL

## 2022-02-25 VITALS
TEMPERATURE: 98.3 F | HEART RATE: 75 BPM | RESPIRATION RATE: 13 BRPM | OXYGEN SATURATION: 97 % | BODY MASS INDEX: 28.03 KG/M2 | DIASTOLIC BLOOD PRESSURE: 72 MMHG | WEIGHT: 174.4 LBS | HEIGHT: 66 IN | SYSTOLIC BLOOD PRESSURE: 133 MMHG

## 2022-02-25 DIAGNOSIS — Z09 SURGERY FOLLOW-UP: Primary | ICD-10-CM

## 2022-02-25 LAB
ABO + RH BLD: NORMAL
BLOOD GROUP ANTIBODIES SERPL: NORMAL
HCG UR QL: NEGATIVE
SPECIMEN EXP DATE BLD: NORMAL

## 2022-02-25 PROCEDURE — 77030008477 HC STYL SATN SLP COVD -A: Performed by: SPECIALIST

## 2022-02-25 PROCEDURE — 74011000250 HC RX REV CODE- 250: Performed by: NURSE ANESTHETIST, CERTIFIED REGISTERED

## 2022-02-25 PROCEDURE — 77030035277 HC OBTRTR BLDELSS DISP INTU -B: Performed by: OBSTETRICS & GYNECOLOGY

## 2022-02-25 PROCEDURE — 74011000272 HC RX REV CODE- 272: Performed by: OBSTETRICS & GYNECOLOGY

## 2022-02-25 PROCEDURE — 77030020782 HC GWN BAIR PAWS FLX 3M -B: Performed by: OBSTETRICS & GYNECOLOGY

## 2022-02-25 PROCEDURE — 77030034667 HC ACC PLATFRM ENDO GELPNT AMR -E: Performed by: OBSTETRICS & GYNECOLOGY

## 2022-02-25 PROCEDURE — 77030019927 HC TBNG IRR CYSTO BAXT -A: Performed by: OBSTETRICS & GYNECOLOGY

## 2022-02-25 PROCEDURE — 77030010507 HC ADH SKN DERMBND J&J -B: Performed by: OBSTETRICS & GYNECOLOGY

## 2022-02-25 PROCEDURE — 81025 URINE PREGNANCY TEST: CPT

## 2022-02-25 PROCEDURE — 77030006643: Performed by: SPECIALIST

## 2022-02-25 PROCEDURE — 74011250636 HC RX REV CODE- 250/636: Performed by: OBSTETRICS & GYNECOLOGY

## 2022-02-25 PROCEDURE — 74011250636 HC RX REV CODE- 250/636: Performed by: NURSE ANESTHETIST, CERTIFIED REGISTERED

## 2022-02-25 PROCEDURE — 77030002933 HC SUT MCRYL J&J -A: Performed by: OBSTETRICS & GYNECOLOGY

## 2022-02-25 PROCEDURE — 77030020703 HC SEAL CANN DISP INTU -B: Performed by: OBSTETRICS & GYNECOLOGY

## 2022-02-25 PROCEDURE — 77030040361 HC SLV COMPR DVT MDII -B: Performed by: OBSTETRICS & GYNECOLOGY

## 2022-02-25 PROCEDURE — 77030031139 HC SUT VCRL2 J&J -A: Performed by: OBSTETRICS & GYNECOLOGY

## 2022-02-25 PROCEDURE — 76210000026 HC REC RM PH II 1 TO 1.5 HR: Performed by: OBSTETRICS & GYNECOLOGY

## 2022-02-25 PROCEDURE — 76060000033 HC ANESTHESIA 1 TO 1.5 HR: Performed by: OBSTETRICS & GYNECOLOGY

## 2022-02-25 PROCEDURE — 77030031492 HC PRT ACC BLNT AIRSEAL CNMD -B: Performed by: OBSTETRICS & GYNECOLOGY

## 2022-02-25 PROCEDURE — 77030028402 HC SYS LAPSC TISS RETRV AMR -B: Performed by: OBSTETRICS & GYNECOLOGY

## 2022-02-25 PROCEDURE — 74011250637 HC RX REV CODE- 250/637: Performed by: SPECIALIST

## 2022-02-25 PROCEDURE — 76210000016 HC OR PH I REC 1 TO 1.5 HR: Performed by: OBSTETRICS & GYNECOLOGY

## 2022-02-25 PROCEDURE — 74011250636 HC RX REV CODE- 250/636: Performed by: SPECIALIST

## 2022-02-25 PROCEDURE — 76010000934 HC OR TIME 1 TO 1.5HR INTENSV - TIER 2: Performed by: OBSTETRICS & GYNECOLOGY

## 2022-02-25 PROCEDURE — 77030040830 HC CATH URETH FOL MDII -A: Performed by: OBSTETRICS & GYNECOLOGY

## 2022-02-25 PROCEDURE — 77030008683 HC TU ET CUF COVD -A: Performed by: SPECIALIST

## 2022-02-25 PROCEDURE — 77030008574 HC TBNG SUC IRR STRY -B: Performed by: OBSTETRICS & GYNECOLOGY

## 2022-02-25 PROCEDURE — 77030016151 HC PROTCTR LNS DFOG COVD -B: Performed by: OBSTETRICS & GYNECOLOGY

## 2022-02-25 PROCEDURE — 74011000250 HC RX REV CODE- 250: Performed by: OBSTETRICS & GYNECOLOGY

## 2022-02-25 PROCEDURE — 36415 COLL VENOUS BLD VENIPUNCTURE: CPT

## 2022-02-25 PROCEDURE — 88307 TISSUE EXAM BY PATHOLOGIST: CPT

## 2022-02-25 PROCEDURE — 2709999900 HC NON-CHARGEABLE SUPPLY: Performed by: OBSTETRICS & GYNECOLOGY

## 2022-02-25 PROCEDURE — 86900 BLOOD TYPING SEROLOGIC ABO: CPT

## 2022-02-25 RX ORDER — ACETAMINOPHEN 500 MG
1000 TABLET ORAL ONCE
Status: COMPLETED | OUTPATIENT
Start: 2022-02-25 | End: 2022-02-25

## 2022-02-25 RX ORDER — DEXAMETHASONE SODIUM PHOSPHATE 4 MG/ML
INJECTION, SOLUTION INTRA-ARTICULAR; INTRALESIONAL; INTRAMUSCULAR; INTRAVENOUS; SOFT TISSUE AS NEEDED
Status: DISCONTINUED | OUTPATIENT
Start: 2022-02-25 | End: 2022-02-25 | Stop reason: HOSPADM

## 2022-02-25 RX ORDER — ACETAMINOPHEN 500 MG
TABLET ORAL
COMMUNITY

## 2022-02-25 RX ORDER — ENOXAPARIN SODIUM 100 MG/ML
40 INJECTION SUBCUTANEOUS ONCE
Status: COMPLETED | OUTPATIENT
Start: 2022-02-25 | End: 2022-02-25

## 2022-02-25 RX ORDER — ONDANSETRON 2 MG/ML
4 INJECTION INTRAMUSCULAR; INTRAVENOUS ONCE
Status: COMPLETED | OUTPATIENT
Start: 2022-02-25 | End: 2022-02-25

## 2022-02-25 RX ORDER — ROCURONIUM BROMIDE 10 MG/ML
INJECTION, SOLUTION INTRAVENOUS AS NEEDED
Status: DISCONTINUED | OUTPATIENT
Start: 2022-02-25 | End: 2022-02-25 | Stop reason: HOSPADM

## 2022-02-25 RX ORDER — MIDAZOLAM HYDROCHLORIDE 1 MG/ML
INJECTION, SOLUTION INTRAMUSCULAR; INTRAVENOUS AS NEEDED
Status: DISCONTINUED | OUTPATIENT
Start: 2022-02-25 | End: 2022-02-25 | Stop reason: HOSPADM

## 2022-02-25 RX ORDER — HYDROMORPHONE HYDROCHLORIDE 1 MG/ML
0.5 INJECTION, SOLUTION INTRAMUSCULAR; INTRAVENOUS; SUBCUTANEOUS
Status: DISCONTINUED | OUTPATIENT
Start: 2022-02-25 | End: 2022-02-25 | Stop reason: HOSPADM

## 2022-02-25 RX ORDER — FENTANYL CITRATE 50 UG/ML
INJECTION, SOLUTION INTRAMUSCULAR; INTRAVENOUS AS NEEDED
Status: DISCONTINUED | OUTPATIENT
Start: 2022-02-25 | End: 2022-02-25 | Stop reason: HOSPADM

## 2022-02-25 RX ORDER — SODIUM CHLORIDE, SODIUM LACTATE, POTASSIUM CHLORIDE, CALCIUM CHLORIDE 600; 310; 30; 20 MG/100ML; MG/100ML; MG/100ML; MG/100ML
50 INJECTION, SOLUTION INTRAVENOUS
Status: COMPLETED | OUTPATIENT
Start: 2022-02-25 | End: 2022-02-25

## 2022-02-25 RX ORDER — BUPIVACAINE HYDROCHLORIDE 2.5 MG/ML
INJECTION, SOLUTION EPIDURAL; INFILTRATION; INTRACAUDAL AS NEEDED
Status: DISCONTINUED | OUTPATIENT
Start: 2022-02-25 | End: 2022-02-25 | Stop reason: HOSPADM

## 2022-02-25 RX ORDER — OXYCODONE HYDROCHLORIDE 5 MG/1
5 TABLET ORAL
Status: COMPLETED | OUTPATIENT
Start: 2022-02-25 | End: 2022-02-25

## 2022-02-25 RX ORDER — LIDOCAINE HYDROCHLORIDE 20 MG/ML
INJECTION, SOLUTION EPIDURAL; INFILTRATION; INTRACAUDAL; PERINEURAL AS NEEDED
Status: DISCONTINUED | OUTPATIENT
Start: 2022-02-25 | End: 2022-02-25 | Stop reason: HOSPADM

## 2022-02-25 RX ORDER — FENTANYL CITRATE 50 UG/ML
25 INJECTION, SOLUTION INTRAMUSCULAR; INTRAVENOUS
Status: DISCONTINUED | OUTPATIENT
Start: 2022-02-25 | End: 2022-02-25 | Stop reason: HOSPADM

## 2022-02-25 RX ORDER — KETAMINE HYDROCHLORIDE 10 MG/ML
INJECTION, SOLUTION INTRAMUSCULAR; INTRAVENOUS AS NEEDED
Status: DISCONTINUED | OUTPATIENT
Start: 2022-02-25 | End: 2022-02-25 | Stop reason: HOSPADM

## 2022-02-25 RX ORDER — HYDROMORPHONE HYDROCHLORIDE 2 MG/ML
INJECTION, SOLUTION INTRAMUSCULAR; INTRAVENOUS; SUBCUTANEOUS AS NEEDED
Status: DISCONTINUED | OUTPATIENT
Start: 2022-02-25 | End: 2022-02-25 | Stop reason: HOSPADM

## 2022-02-25 RX ORDER — CEFAZOLIN SODIUM/WATER 2 G/20 ML
2 SYRINGE (ML) INTRAVENOUS ONCE
Status: COMPLETED | OUTPATIENT
Start: 2022-02-25 | End: 2022-02-25

## 2022-02-25 RX ORDER — SODIUM CHLORIDE, SODIUM LACTATE, POTASSIUM CHLORIDE, CALCIUM CHLORIDE 600; 310; 30; 20 MG/100ML; MG/100ML; MG/100ML; MG/100ML
50 INJECTION, SOLUTION INTRAVENOUS CONTINUOUS
Status: DISCONTINUED | OUTPATIENT
Start: 2022-02-25 | End: 2022-02-25 | Stop reason: HOSPADM

## 2022-02-25 RX ORDER — NALOXONE HYDROCHLORIDE 0.4 MG/ML
0.1 INJECTION, SOLUTION INTRAMUSCULAR; INTRAVENOUS; SUBCUTANEOUS
Status: DISCONTINUED | OUTPATIENT
Start: 2022-02-25 | End: 2022-02-25 | Stop reason: HOSPADM

## 2022-02-25 RX ORDER — GLYCOPYRROLATE 0.2 MG/ML
INJECTION INTRAMUSCULAR; INTRAVENOUS AS NEEDED
Status: DISCONTINUED | OUTPATIENT
Start: 2022-02-25 | End: 2022-02-25 | Stop reason: HOSPADM

## 2022-02-25 RX ORDER — SCOLOPAMINE TRANSDERMAL SYSTEM 1 MG/1
1 PATCH, EXTENDED RELEASE TRANSDERMAL
Status: DISCONTINUED | OUTPATIENT
Start: 2022-02-25 | End: 2022-02-25 | Stop reason: HOSPADM

## 2022-02-25 RX ORDER — PROPOFOL 10 MG/ML
INJECTION, EMULSION INTRAVENOUS AS NEEDED
Status: DISCONTINUED | OUTPATIENT
Start: 2022-02-25 | End: 2022-02-25 | Stop reason: HOSPADM

## 2022-02-25 RX ORDER — SODIUM CHLORIDE, SODIUM LACTATE, POTASSIUM CHLORIDE, CALCIUM CHLORIDE 600; 310; 30; 20 MG/100ML; MG/100ML; MG/100ML; MG/100ML
INJECTION, SOLUTION INTRAVENOUS
Status: DISCONTINUED | OUTPATIENT
Start: 2022-02-25 | End: 2022-02-25 | Stop reason: HOSPADM

## 2022-02-25 RX ORDER — EPHEDRINE SULFATE/0.9% NACL/PF 50 MG/5 ML
SYRINGE (ML) INTRAVENOUS AS NEEDED
Status: DISCONTINUED | OUTPATIENT
Start: 2022-02-25 | End: 2022-02-25 | Stop reason: HOSPADM

## 2022-02-25 RX ORDER — ONDANSETRON 2 MG/ML
INJECTION INTRAMUSCULAR; INTRAVENOUS AS NEEDED
Status: DISCONTINUED | OUTPATIENT
Start: 2022-02-25 | End: 2022-02-25 | Stop reason: HOSPADM

## 2022-02-25 RX ORDER — SODIUM CHLORIDE, SODIUM LACTATE, POTASSIUM CHLORIDE, CALCIUM CHLORIDE 600; 310; 30; 20 MG/100ML; MG/100ML; MG/100ML; MG/100ML
125 INJECTION, SOLUTION INTRAVENOUS CONTINUOUS
Status: DISCONTINUED | OUTPATIENT
Start: 2022-02-25 | End: 2022-02-25 | Stop reason: HOSPADM

## 2022-02-25 RX ORDER — NEOSTIGMINE METHYLSULFATE 1 MG/ML
INJECTION, SOLUTION INTRAVENOUS AS NEEDED
Status: DISCONTINUED | OUTPATIENT
Start: 2022-02-25 | End: 2022-02-25 | Stop reason: HOSPADM

## 2022-02-25 RX ADMIN — KETAMINE HYDROCHLORIDE 20 MG: 10 INJECTION, SOLUTION INTRAMUSCULAR; INTRAVENOUS at 11:04

## 2022-02-25 RX ADMIN — FENTANYL CITRATE 100 MCG: 50 INJECTION, SOLUTION INTRAMUSCULAR; INTRAVENOUS at 10:00

## 2022-02-25 RX ADMIN — ROCURONIUM BROMIDE 40 MG: 10 INJECTION, SOLUTION INTRAVENOUS at 10:00

## 2022-02-25 RX ADMIN — OXYCODONE 5 MG: 5 TABLET ORAL at 13:26

## 2022-02-25 RX ADMIN — SODIUM CHLORIDE, SODIUM LACTATE, POTASSIUM CHLORIDE, AND CALCIUM CHLORIDE: 600; 310; 30; 20 INJECTION, SOLUTION INTRAVENOUS at 09:56

## 2022-02-25 RX ADMIN — KETAMINE HYDROCHLORIDE 10 MG: 10 INJECTION, SOLUTION INTRAMUSCULAR; INTRAVENOUS at 10:52

## 2022-02-25 RX ADMIN — Medication 2 G: at 10:06

## 2022-02-25 RX ADMIN — LIDOCAINE HYDROCHLORIDE 60 MG: 20 INJECTION, SOLUTION INTRAVENOUS at 10:00

## 2022-02-25 RX ADMIN — MIDAZOLAM 2 MG: 1 INJECTION INTRAMUSCULAR; INTRAVENOUS at 09:53

## 2022-02-25 RX ADMIN — KETAMINE HYDROCHLORIDE 20 MG: 10 INJECTION, SOLUTION INTRAMUSCULAR; INTRAVENOUS at 10:28

## 2022-02-25 RX ADMIN — Medication 10 MG: at 10:54

## 2022-02-25 RX ADMIN — GLYCOPYRROLATE 0.2 MG: 0.2 INJECTION INTRAMUSCULAR; INTRAVENOUS at 10:21

## 2022-02-25 RX ADMIN — HYDROMORPHONE HYDROCHLORIDE 0.5 MG: 2 INJECTION, SOLUTION INTRAMUSCULAR; INTRAVENOUS; SUBCUTANEOUS at 10:38

## 2022-02-25 RX ADMIN — HYDROMORPHONE HYDROCHLORIDE 0.5 MG: 1 INJECTION, SOLUTION INTRAMUSCULAR; INTRAVENOUS; SUBCUTANEOUS at 11:46

## 2022-02-25 RX ADMIN — GLYCOPYRROLATE 0.4 MG: 0.2 INJECTION INTRAMUSCULAR; INTRAVENOUS at 11:04

## 2022-02-25 RX ADMIN — SODIUM CHLORIDE, POTASSIUM CHLORIDE, SODIUM LACTATE AND CALCIUM CHLORIDE 50 ML/HR: 600; 310; 30; 20 INJECTION, SOLUTION INTRAVENOUS at 13:28

## 2022-02-25 RX ADMIN — HYDROMORPHONE HYDROCHLORIDE 0.5 MG: 1 INJECTION, SOLUTION INTRAMUSCULAR; INTRAVENOUS; SUBCUTANEOUS at 12:03

## 2022-02-25 RX ADMIN — PROPOFOL 200 MG: 10 INJECTION, EMULSION INTRAVENOUS at 10:00

## 2022-02-25 RX ADMIN — Medication 10 MG: at 10:22

## 2022-02-25 RX ADMIN — DEXAMETHASONE SODIUM PHOSPHATE 4 MG: 4 INJECTION, SOLUTION INTRAMUSCULAR; INTRAVENOUS at 10:51

## 2022-02-25 RX ADMIN — SODIUM CHLORIDE, SODIUM LACTATE, POTASSIUM CHLORIDE, AND CALCIUM CHLORIDE 125 ML/HR: 600; 310; 30; 20 INJECTION, SOLUTION INTRAVENOUS at 09:33

## 2022-02-25 RX ADMIN — ROCURONIUM BROMIDE 10 MG: 10 INJECTION, SOLUTION INTRAVENOUS at 10:31

## 2022-02-25 RX ADMIN — HYDROMORPHONE HYDROCHLORIDE 0.5 MG: 2 INJECTION, SOLUTION INTRAMUSCULAR; INTRAVENOUS; SUBCUTANEOUS at 10:29

## 2022-02-25 RX ADMIN — ACETAMINOPHEN 1000 MG: 500 TABLET ORAL at 09:49

## 2022-02-25 RX ADMIN — SODIUM CHLORIDE, SODIUM LACTATE, POTASSIUM CHLORIDE, AND CALCIUM CHLORIDE 50 ML/HR: 600; 310; 30; 20 INJECTION, SOLUTION INTRAVENOUS at 09:46

## 2022-02-25 RX ADMIN — SODIUM CHLORIDE, SODIUM LACTATE, POTASSIUM CHLORIDE, AND CALCIUM CHLORIDE: 600; 310; 30; 20 INJECTION, SOLUTION INTRAVENOUS at 11:22

## 2022-02-25 RX ADMIN — Medication 3 MG: at 11:04

## 2022-02-25 RX ADMIN — ONDANSETRON 4 MG: 2 INJECTION INTRAMUSCULAR; INTRAVENOUS at 13:26

## 2022-02-25 RX ADMIN — ONDANSETRON HYDROCHLORIDE 4 MG: 2 INJECTION INTRAMUSCULAR; INTRAVENOUS at 10:51

## 2022-02-25 RX ADMIN — ENOXAPARIN SODIUM 40 MG: 100 INJECTION SUBCUTANEOUS at 09:46

## 2022-02-25 NOTE — DISCHARGE INSTRUCTIONS
Abdominal Hysterectomy: What to Expect at Home  Your Recovery     An abdominal hysterectomy removes the uterus through a large cut (incision) in the belly. Your doctor made an incision in your lower belly and took out your uterus. You can expect to feel better and stronger each day. But you might need pain medicine for a week or two. You may get tired easily or have less energy than usual. This may last for several weeks after surgery. You will probably notice that your belly is swollen and puffy. This is common. The swelling will take several weeks to go down. It may take about 4 to 6 weeks to fully recover. It's important to avoid lifting while you are recovering so that you can heal.  This care sheet gives you a general idea about how long it will take for you to recover. But each person recovers at a different pace. Follow the steps below to get better as quickly as possible. How can you care for yourself at home? Activity    · Rest when you feel tired. Getting enough sleep will help you recover.     · Try to walk each day. Start by walking a little more than you did the day before. Bit by bit, increase the amount you walk. Walking boosts blood flow and helps prevent pneumonia and constipation.     · Avoid lifting anything that would make you strain. This may include a child, heavy grocery bags and milk containers, a heavy briefcase or backpack, cat litter or dog food bags, or a vacuum .     · Avoid strenuous activities, such as biking, jogging, weight lifting, or aerobic exercise, until your doctor says it is okay.     · You may shower. Pat the cut (incision) dry. Do not take a bath for the first 2 weeks, or until your doctor tells you it is okay.     · Ask your doctor when you can drive again.     · You will probably need to take 2 to 4 weeks off from work. It depends on the type of work you do and how you feel.     · Your doctor will tell you when you can have sex again.    Diet    · You can eat your normal diet. If your stomach is upset, try bland, low-fat foods like plain rice, broiled chicken, toast, and yogurt.     · Drink plenty of fluids (unless your doctor tells you not to).     · You may notice that your bowel movements are not regular right after your surgery. This is common. Try to avoid constipation and straining with bowel movements. You may want to take a fiber supplement every day. If you have not had a bowel movement after a couple of days, ask your doctor about taking a mild laxative. Medicines    · Your doctor will tell you if and when you can restart your medicines. You will also get instructions about taking any new medicines.     · If you take aspirin or some other blood thinner, ask your doctor if and when to start taking it again. Make sure that you understand exactly what your doctor wants you to do.     · Be safe with medicines. Take pain medicines exactly as directed. ? If the doctor gave you a prescription medicine for pain, take it as prescribed. ? If you are not taking a prescription pain medicine, ask your doctor if you can take an over-the-counter medicine.     · If your doctor prescribed antibiotics, take them as directed. Do not stop taking them just because you feel better. You need to take the full course of antibiotics.     · If you think your pain medicine is making you sick to your stomach:  ? Take your medicine after meals (unless your doctor has told you not to). ? Ask your doctor for a different pain medicine. Incision care    · If you have strips of tape on the cut (incision) the doctor made, leave the tape on for a week or until it falls off. Or follow your doctor's instructions for removing the tape.     · Keep the area clean and dry. Other instructions    · You may have some light vaginal bleeding. Wear sanitary pads if needed. Do not douche or use tampons. Follow-up care is a key part of your treatment and safety.  Be sure to make and go to all appointments, and call your doctor if you are having problems. It's also a good idea to know your test results and keep a list of the medicines you take. When should you call for help? Call 911 anytime you think you may need emergency care. For example, call if:    · You passed out (lost consciousness).     · You have chest pain, are short of breath, or cough up blood. Call your doctor now or seek immediate medical care if:    · You have pain that does not get better after you take pain medicine.     · You cannot pass stools or gas.     · You have vaginal discharge that has increased in amount or smells bad.     · You are sick to your stomach or cannot drink fluids.     · You have loose stitches, or your incision comes open.     · Bright red blood has soaked through the bandage over your incision.     · You have signs of infection, such as:  ? Increased pain, swelling, warmth, or redness. ? Red streaks leading from the incision. ? Pus draining from the incision. ? A fever.     · You have bright red vaginal bleeding that soaks one or more pads in an hour, or you have large clots.     · You have signs of a blood clot in your leg (called a deep vein thrombosis), such as:  ? Pain in your calf, back of the knee, thigh, or groin. ? Redness and swelling in your leg. Watch closely for changes in your health, and be sure to contact your doctor if you have any problems. Where can you learn more? Go to http://www.gray.com/  Enter M280 in the search box to learn more about \"Abdominal Hysterectomy: What to Expect at Home. \"  Current as of: February 11, 2021               Content Version: 13.0  © 6493-0360 [employer]. Care instructions adapted under license by [employer] (which disclaims liability or warranty for this information).  If you have questions about a medical condition or this instruction, always ask your healthcare professional. [employer] disclaims any warranty or liability for your use of this information. DISCHARGE SUMMARY from Nurse    PATIENT INSTRUCTIONS:    After general anesthesia or intravenous sedation, for 24 hours or while taking prescription Narcotics:  · Limit your activities  · Do not drive and operate hazardous machinery  · Do not make important personal or business decisions  · Do  not drink alcoholic beverages  · If you have not urinated within 8 hours after discharge, please contact your surgeon on call. Report the following to your surgeon:  · Excessive pain, swelling, redness or odor of or around the surgical area  · Temperature over 100.5  · Nausea and vomiting lasting longer than 4 hours or if unable to take medications  · Any signs of decreased circulation or nerve impairment to extremity: change in color, persistent  numbness, tingling, coldness or increase pain  · Any questions    What to do at Home:  Recommended activity: Activity as tolerated and no driving for today. If you experience any of the following symptoms as listed above, please follow up with Dr. Moy Sandoval. *  Please give a list of your current medications to your Primary Care Provider. *  Please update this list whenever your medications are discontinued, doses are      changed, or new medications (including over-the-counter products) are added. *  Please carry medication information at all times in case of emergency situations. These are general instructions for a healthy lifestyle:    No smoking/ No tobacco products/ Avoid exposure to second hand smoke  Surgeon General's Warning:  Quitting smoking now greatly reduces serious risk to your health.     Obesity, smoking, and sedentary lifestyle greatly increases your risk for illness    A healthy diet, regular physical exercise & weight monitoring are important for maintaining a healthy lifestyle    You may be retaining fluid if you have a history of heart failure or if you experience any of the following symptoms:  Weight gain of 3 pounds or more overnight or 5 pounds in a week, increased swelling in our hands or feet or shortness of breath while lying flat in bed. Please call your doctor as soon as you notice any of these symptoms; do not wait until your next office visit. The discharge information has been reviewed with the patient and caregiver. The patient and caregiver verbalized understanding. Discharge medications reviewed with the patient and caregiver and appropriate educational materials and side effects teaching were provided. Patient armband removed and shredded.     ___________________________________________________________________________________________________________________________________

## 2022-02-25 NOTE — PERIOP NOTES
Reviewed PTA medication list with patient/caregiver and patient/caregiver denies any additional medications. Patient admits to having a responsible adult care for them at home for at least 24 hours after surgery. Patient encouraged to use gown warming system and informed that using said warming gown to regulate body temperature prior to a procedure has been shown to help reduce the risks of blood clots and infection. Patient's pharmacy of choice verified and documented in PTA medication section. Dual skin assessment & fall risk band verification completed with Vargas Tracey RN.

## 2022-02-25 NOTE — PERIOP NOTES
Discharge instructions were reviewed with patient and her  by Jorge Augustin. Patient discharged to home without incident.

## 2022-02-25 NOTE — OP NOTES
Postoperative Note    Patient: Mari Garcia  YOB: 1985  MRN: [id number]    Date of Procedure: 2/25/2022     Pre-Op Diagnosis: ENDOMETRIOSIS, DYSMENORRHEA    Post-Op Diagnosis: Same as preoperative diagnosis. Procedure(s):  ROBOTIC ASSISTED SUPRACERVICAL LAPAROSCOPIC HYSTERECTOMY,BILATERAL SALPINGECTOMY    Surgeon(s):  Lisa Nath MD    Surgical Assistant: Surg Asst-1: Bianca Motley    Anesthesia: General     Estimated Blood Loss (mL): less than 50     Complications: None    Specimens:   ID Type Source Tests Collected by Time Destination   1 : uterus and right fallopian tube Preservative Uterus  Lisa Nath MD 2/25/2022 1039 Pathology        Implants: * No implants in log *    Drains: * No LDAs found *    Findings: grossly normal uterus, right ovary and right tube. Absent left ovary and tubes    Procedure: The patient was taken to the operating room after informed consent had been obtained. She was placed on the OR table and general anesthesia was initiated. She was then placed in the dorsal lithotomy position, prepped and draped in a sterile fashion. Gonzalez catheter was inserted. Serial compression stocking were in place. Time out was completed. Attention was turned to the vagina where a weighted-speculum was placed. A Santo retractor was used to expose the cervix and the cervix was grasped at the 12 oclock position with a single tooth tenaculum. The Yessica uterine manipulator was affixed to the cervix and the other instruments were removed from the vagina. Attention was turned to the abdomen, where Marcaine was injected 3 cm above the umbilicus. An 8 mm insicion was made and an 8 mm nonbladed trocar was inserted. Once intra-peritoneal placement was verified, high flow insufflation was initiated. Three other ports were made a hands breath from the midline on the left and right in a similar manner with the third an AirSeal in the left upper quadrant.  The patient was placed in steep trendelenburg. The Roadhop robot was docked. The ureters were identified bilaterally. The right round ligament was transected near the pelvic sidewall. The peritoneum was opened into the right pericolic gutter. The right ureter was identified. A window was created in the peritoneum and the IP ligament was bipolar cauterized. The IP ligament was transected. The adhesions of the omentum was dissected free from the posterior uterus. The right portion of the bladder flap was created. The same procedure was used on the left side. The left portion of the bladder flap was made. The left uterine artery was transected. The same procedure was used on the right side. The uterine fundus was transected from the cervix. When the uterine fundus was partially transected, the Yessica uterine manipulator was removed. The endocervix was cauterized. The remaining attachments of the uterus were transected. A rip stop laparoscopic bag was placed through the umbilical incision after it had been enlarged. The uterine fundus was placed in the bag. The bag was brought out through the umbilicus. The uterine fundus was morcellated by hand in the bag. The bag was retrieved from the abdomen. The abdomen was cleared of all clot and debris. Hemostasis was verified. The instruments were removed from the abdomen. The umbilical incision fascia was closed with 0-Vicryl running and 4-0 Vicryl interrupted for the skin edge. The other incisions were closed with Dermabond for the skin edges. The suresh catheter was removed. The cystoscope was inserted with fluid running. The bladder mucosa was intact. The ureteral orifices were patent. The patient was returned to recovery in stable condition.   Electronically Signed by Trudy Ely MD on 2/25/2022 at 3:43 PM

## 2022-02-25 NOTE — ANESTHESIA POSTPROCEDURE EVALUATION
Post-Anesthesia Evaluation and Assessment    Cardiovascular Function/Vital Signs  Visit Vitals  /80   Pulse 81   Temp 36.2 °C (97.1 °F)   Resp 16   Ht 5' 6\" (1.676 m)   Wt 79.1 kg (174 lb 6.4 oz)   SpO2 97%   BMI 28.15 kg/m²       Patient is status post Procedure(s):  ROBOTIC ASSISTED SUPRACERVICAL LAPAROSCOPIC HYSTERECTOMY,BILATERAL SALPINGECTOMY. Nausea/Vomiting: Controlled. Postoperative hydration reviewed and adequate. Pain:  Pain Scale 1: FLACC (02/25/22 1211)  Pain Intensity 1: 0 (02/25/22 1211)   Managed. Neurological Status:   Neuro (WDL): Exceptions to WDL (02/25/22 1158)   At baseline. Mental Status and Level of Consciousness: Baseline and appropriate for discharge. Pulmonary Status:   O2 Device: None (Room air) (02/25/22 1158)   Adequate oxygenation and airway patent. Complications related to anesthesia: None    Post-anesthesia assessment completed. No concerns. Patient has met all discharge requirements.     Signed By: Cristina Hearn MD    February 25, 2022

## 2022-02-25 NOTE — ANESTHESIA PREPROCEDURE EVALUATION
Relevant Problems   GASTROINTESTINAL   (+) Gastroesophageal reflux disease      HEMATOLOGY   (+) Anemia       Anesthetic History     PONV          Review of Systems / Medical History  Patient summary reviewed, nursing notes reviewed and pertinent labs reviewed    Pulmonary  Within defined limits                 Neuro/Psych         Psychiatric history     Cardiovascular  Within defined limits              Pertinent negatives: No hypertension  Exercise tolerance: >4 METS  Comments: Issues with HTN while on birth control   GI/Hepatic/Renal     GERD           Endo/Other  Within defined limits           Other Findings   Comments: History of DVT-Factor V Leiden         Physical Exam    Airway  Mallampati: II  TM Distance: 4 - 6 cm  Neck ROM: normal range of motion   Mouth opening: Normal     Cardiovascular               Dental  No notable dental hx       Pulmonary                 Abdominal         Other Findings            Anesthetic Plan    ASA: 2  Anesthesia type: general          Induction: Intravenous  Anesthetic plan and risks discussed with: Patient

## 2022-02-25 NOTE — INTERVAL H&P NOTE
Update History & Physical    The Patient's History and Physical was reviewed with the patient and I examined the patient. There was no change. The surgical site was confirmed by the patient and me. Plan:  The risk, benefits, expected outcome, and alternative to the recommended procedure have been discussed with the patient. Patient understands and wants to proceed with the procedure.     Electronically signed by Chacha Rodriguez MD on 2/25/2022 at 9:31 AM

## 2022-03-08 ENCOUNTER — HOSPITAL ENCOUNTER (OUTPATIENT)
Dept: PREADMISSION TESTING | Age: 37
Discharge: HOME | End: 2022-03-08
Payer: COMMERCIAL

## 2022-03-08 ENCOUNTER — HOSPITAL ENCOUNTER (OUTPATIENT)
Dept: PREADMISSION TESTING | Age: 37
Discharge: HOME | End: 2022-03-08

## 2022-03-08 LAB
ANION GAP SERPL CALC-SCNC: 7 MMOL/L (ref 3–18)
BUN SERPL-MCNC: 12 MG/DL (ref 7–18)
BUN/CREAT SERPL: 17 (ref 12–20)
CALCIUM SERPL-MCNC: 9.5 MG/DL (ref 8.5–10.1)
CHLORIDE SERPL-SCNC: 104 MMOL/L (ref 100–111)
CO2 SERPL-SCNC: 28 MMOL/L (ref 21–32)
CREAT SERPL-MCNC: 0.72 MG/DL (ref 0.6–1.3)
ERYTHROCYTE [DISTWIDTH] IN BLOOD BY AUTOMATED COUNT: 11.9 % (ref 11.6–14.5)
GLUCOSE SERPL-MCNC: 68 MG/DL (ref 74–99)
HCT VFR BLD AUTO: 43.7 % (ref 35–45)
HGB BLD-MCNC: 14.4 G/DL (ref 12–16)
MCH RBC QN AUTO: 30 PG (ref 24–34)
MCHC RBC AUTO-ENTMCNC: 33 G/DL (ref 31–37)
MCV RBC AUTO: 91 FL (ref 78–100)
NRBC # BLD: 0 K/UL (ref 0–0.01)
NRBC BLD-RTO: 0 PER 100 WBC
PLATELET # BLD AUTO: 287 K/UL (ref 135–420)
PMV BLD AUTO: 9.5 FL (ref 9.2–11.8)
POTASSIUM SERPL-SCNC: 4.1 MMOL/L (ref 3.5–5.5)
RBC # BLD AUTO: 4.8 M/UL (ref 4.2–5.3)
SODIUM SERPL-SCNC: 139 MMOL/L (ref 136–145)
WBC # BLD AUTO: 8.4 K/UL (ref 4.6–13.2)

## 2022-03-08 PROCEDURE — 80048 BASIC METABOLIC PNL TOTAL CA: CPT

## 2022-03-08 PROCEDURE — 85027 COMPLETE CBC AUTOMATED: CPT

## 2022-03-08 PROCEDURE — 36415 COLL VENOUS BLD VENIPUNCTURE: CPT

## 2022-03-09 VITALS — WEIGHT: 170 LBS | BODY MASS INDEX: 27.32 KG/M2 | HEIGHT: 66 IN

## 2022-03-09 RX ORDER — SODIUM CHLORIDE, SODIUM LACTATE, POTASSIUM CHLORIDE, CALCIUM CHLORIDE 600; 310; 30; 20 MG/100ML; MG/100ML; MG/100ML; MG/100ML
125 INJECTION, SOLUTION INTRAVENOUS CONTINUOUS
Status: CANCELLED | OUTPATIENT
Start: 2022-03-09

## 2022-03-09 RX ORDER — CEFAZOLIN SODIUM/WATER 2 G/20 ML
2 SYRINGE (ML) INTRAVENOUS ONCE
Status: CANCELLED | OUTPATIENT
Start: 2022-03-11 | End: 2022-03-11

## 2022-03-09 NOTE — PERIOP NOTES
Leave all valuables at home or loved ones;to include wallets/purse, money/credit cards, electronics  such as laptops and tablets. If you want to have your prescriptions filled here, please have some form of payment with your . Please arrange for your transportation home Denies any prosthetics. Patient states that the family physician is not aware of upcoming procedure. Stop nsaids 7 days prior to [place]. Do not put any lotion, jewelry, makeup, fingernail or toenail polish; no wigs, no private piercings; no tictac,gum and mouthwash. Denies sleep apnea. Mother has nausea with anesthesia. Please be aware that due to unforeseen circumstances, delays may occur and your patience will be appreciated. If you ae scheduled to be discharged the same day, please plan to be with us for most of the day. If an inpatient, room assignments may be delayed as well. Our priority is to make you as comfortable as possible and to keep your family informed of your status when possible. Denies shortness of breath nor chest pain while climbing stairs. No dnr. covid + jan 2022.  Factor 5

## 2022-03-10 ENCOUNTER — ANESTHESIA EVENT (OUTPATIENT)
Dept: SURGERY | Age: 37
End: 2022-03-10
Payer: COMMERCIAL

## 2022-03-11 ENCOUNTER — HOSPITAL ENCOUNTER (OUTPATIENT)
Age: 37
Setting detail: OUTPATIENT SURGERY
Discharge: HOME | End: 2022-03-11
Attending: SURGERY | Admitting: SURGERY
Payer: COMMERCIAL

## 2022-03-11 ENCOUNTER — ANESTHESIA (OUTPATIENT)
Dept: SURGERY | Age: 37
End: 2022-03-11
Payer: COMMERCIAL

## 2022-03-11 VITALS
RESPIRATION RATE: 14 BRPM | DIASTOLIC BLOOD PRESSURE: 89 MMHG | SYSTOLIC BLOOD PRESSURE: 134 MMHG | OXYGEN SATURATION: 96 % | BODY MASS INDEX: 27.95 KG/M2 | TEMPERATURE: 98.5 F | WEIGHT: 173.9 LBS | HEART RATE: 99 BPM | HEIGHT: 66 IN

## 2022-03-11 DIAGNOSIS — Z86.718 HISTORY OF DVT (DEEP VEIN THROMBOSIS): ICD-10-CM

## 2022-03-11 DIAGNOSIS — K43.0 INCISIONAL HERNIA WITH OBSTRUCTION BUT NO GANGRENE: Primary | ICD-10-CM

## 2022-03-11 DIAGNOSIS — Z15.89 MTHFR MUTATION: ICD-10-CM

## 2022-03-11 PROCEDURE — 74011250636 HC RX REV CODE- 250/636: Performed by: NURSE ANESTHETIST, CERTIFIED REGISTERED

## 2022-03-11 PROCEDURE — 74011000258 HC RX REV CODE- 258: Performed by: SURGERY

## 2022-03-11 PROCEDURE — 74011250636 HC RX REV CODE- 250/636: Performed by: ANESTHESIOLOGY

## 2022-03-11 PROCEDURE — 76010000149 HC OR TIME 1 TO 1.5 HR: Performed by: SURGERY

## 2022-03-11 PROCEDURE — 77030008477 HC STYL SATN SLP COVD -A: Performed by: NURSE ANESTHETIST, CERTIFIED REGISTERED

## 2022-03-11 PROCEDURE — 77030040361 HC SLV COMPR DVT MDII -B: Performed by: SURGERY

## 2022-03-11 PROCEDURE — 77030020782 HC GWN BAIR PAWS FLX 3M -B: Performed by: SURGERY

## 2022-03-11 PROCEDURE — 77030002933 HC SUT MCRYL J&J -A: Performed by: SURGERY

## 2022-03-11 PROCEDURE — 74011250636 HC RX REV CODE- 250/636: Performed by: SURGERY

## 2022-03-11 PROCEDURE — 2709999900 HC NON-CHARGEABLE SUPPLY: Performed by: SURGERY

## 2022-03-11 PROCEDURE — 77030031139 HC SUT VCRL2 J&J -A: Performed by: SURGERY

## 2022-03-11 PROCEDURE — 77030036554: Performed by: SURGERY

## 2022-03-11 PROCEDURE — 76210000006 HC OR PH I REC 0.5 TO 1 HR: Performed by: SURGERY

## 2022-03-11 PROCEDURE — 76210000020 HC REC RM PH II FIRST 0.5 HR: Performed by: SURGERY

## 2022-03-11 PROCEDURE — 77030008683 HC TU ET CUF COVD -A: Performed by: NURSE ANESTHETIST, CERTIFIED REGISTERED

## 2022-03-11 PROCEDURE — 76060000033 HC ANESTHESIA 1 TO 1.5 HR: Performed by: SURGERY

## 2022-03-11 PROCEDURE — 74011000250 HC RX REV CODE- 250: Performed by: NURSE ANESTHETIST, CERTIFIED REGISTERED

## 2022-03-11 PROCEDURE — 77030006643: Performed by: NURSE ANESTHETIST, CERTIFIED REGISTERED

## 2022-03-11 PROCEDURE — 77030002986 HC SUT PROL J&J -A: Performed by: SURGERY

## 2022-03-11 PROCEDURE — 74011000272 HC RX REV CODE- 272: Performed by: SURGERY

## 2022-03-11 PROCEDURE — C1781 MESH (IMPLANTABLE): HCPCS | Performed by: SURGERY

## 2022-03-11 PROCEDURE — 74011000250 HC RX REV CODE- 250: Performed by: SURGERY

## 2022-03-11 PROCEDURE — C9290 INJ, BUPIVACAINE LIPOSOME: HCPCS | Performed by: SURGERY

## 2022-03-11 PROCEDURE — 77030010507 HC ADH SKN DERMBND J&J -B: Performed by: SURGERY

## 2022-03-11 PROCEDURE — 77030002966 HC SUT PDS J&J -A: Performed by: SURGERY

## 2022-03-11 DEVICE — MESH SURG DIA86CM POLY PGLA CLLGN FLM RIG ABSRB EXP SEMI: Type: IMPLANTABLE DEVICE | Site: ABDOMEN | Status: FUNCTIONAL

## 2022-03-11 RX ORDER — SODIUM CHLORIDE 0.9 % (FLUSH) 0.9 %
5-40 SYRINGE (ML) INJECTION AS NEEDED
Status: DISCONTINUED | OUTPATIENT
Start: 2022-03-11 | End: 2022-03-11 | Stop reason: HOSPADM

## 2022-03-11 RX ORDER — ENOXAPARIN SODIUM 100 MG/ML
40 INJECTION SUBCUTANEOUS EVERY 24 HOURS
Qty: 14 EACH | Refills: 0 | Status: SHIPPED | OUTPATIENT
Start: 2022-03-11

## 2022-03-11 RX ORDER — MAGNESIUM SULFATE 100 %
4 CRYSTALS MISCELLANEOUS AS NEEDED
Status: DISCONTINUED | OUTPATIENT
Start: 2022-03-11 | End: 2022-03-11 | Stop reason: HOSPADM

## 2022-03-11 RX ORDER — LIDOCAINE HYDROCHLORIDE 20 MG/ML
INJECTION, SOLUTION EPIDURAL; INFILTRATION; INTRACAUDAL; PERINEURAL AS NEEDED
Status: DISCONTINUED | OUTPATIENT
Start: 2022-03-11 | End: 2022-03-11 | Stop reason: HOSPADM

## 2022-03-11 RX ORDER — FENTANYL CITRATE 50 UG/ML
INJECTION, SOLUTION INTRAMUSCULAR; INTRAVENOUS AS NEEDED
Status: DISCONTINUED | OUTPATIENT
Start: 2022-03-11 | End: 2022-03-11 | Stop reason: HOSPADM

## 2022-03-11 RX ORDER — PROCHLORPERAZINE MALEATE 10 MG
5 TABLET ORAL
Status: DISCONTINUED | OUTPATIENT
Start: 2022-03-11 | End: 2022-03-11 | Stop reason: HOSPADM

## 2022-03-11 RX ORDER — ENOXAPARIN SODIUM 100 MG/ML
40 INJECTION SUBCUTANEOUS ONCE
Status: COMPLETED | OUTPATIENT
Start: 2022-03-11 | End: 2022-03-11

## 2022-03-11 RX ORDER — NEOSTIGMINE METHYLSULFATE 1 MG/ML
INJECTION, SOLUTION INTRAVENOUS AS NEEDED
Status: DISCONTINUED | OUTPATIENT
Start: 2022-03-11 | End: 2022-03-11 | Stop reason: HOSPADM

## 2022-03-11 RX ORDER — GLYCOPYRROLATE 0.2 MG/ML
INJECTION INTRAMUSCULAR; INTRAVENOUS AS NEEDED
Status: DISCONTINUED | OUTPATIENT
Start: 2022-03-11 | End: 2022-03-11 | Stop reason: HOSPADM

## 2022-03-11 RX ORDER — HYDROMORPHONE HYDROCHLORIDE 1 MG/ML
INJECTION, SOLUTION INTRAMUSCULAR; INTRAVENOUS; SUBCUTANEOUS
Status: DISCONTINUED
Start: 2022-03-11 | End: 2022-03-11 | Stop reason: HOSPADM

## 2022-03-11 RX ORDER — OXYCODONE AND ACETAMINOPHEN 5; 325 MG/1; MG/1
1 TABLET ORAL
Qty: 20 TABLET | Refills: 0 | Status: SHIPPED | OUTPATIENT
Start: 2022-03-11 | End: 2022-03-16

## 2022-03-11 RX ORDER — HYDROMORPHONE HYDROCHLORIDE 1 MG/ML
0.5 INJECTION, SOLUTION INTRAMUSCULAR; INTRAVENOUS; SUBCUTANEOUS
Status: DISCONTINUED | OUTPATIENT
Start: 2022-03-11 | End: 2022-03-11 | Stop reason: HOSPADM

## 2022-03-11 RX ORDER — MIDAZOLAM HYDROCHLORIDE 1 MG/ML
INJECTION, SOLUTION INTRAMUSCULAR; INTRAVENOUS AS NEEDED
Status: DISCONTINUED | OUTPATIENT
Start: 2022-03-11 | End: 2022-03-11 | Stop reason: HOSPADM

## 2022-03-11 RX ORDER — HYDROMORPHONE HYDROCHLORIDE 1 MG/ML
INJECTION, SOLUTION INTRAMUSCULAR; INTRAVENOUS; SUBCUTANEOUS AS NEEDED
Status: DISCONTINUED | OUTPATIENT
Start: 2022-03-11 | End: 2022-03-11 | Stop reason: HOSPADM

## 2022-03-11 RX ORDER — CEFAZOLIN SODIUM/WATER 2 G/20 ML
2 SYRINGE (ML) INTRAVENOUS ONCE
Status: COMPLETED | OUTPATIENT
Start: 2022-03-11 | End: 2022-03-11

## 2022-03-11 RX ORDER — SODIUM CHLORIDE, SODIUM LACTATE, POTASSIUM CHLORIDE, CALCIUM CHLORIDE 600; 310; 30; 20 MG/100ML; MG/100ML; MG/100ML; MG/100ML
125 INJECTION, SOLUTION INTRAVENOUS CONTINUOUS
Status: DISCONTINUED | OUTPATIENT
Start: 2022-03-11 | End: 2022-03-11 | Stop reason: HOSPADM

## 2022-03-11 RX ORDER — ONDANSETRON 2 MG/ML
INJECTION INTRAMUSCULAR; INTRAVENOUS AS NEEDED
Status: DISCONTINUED | OUTPATIENT
Start: 2022-03-11 | End: 2022-03-11 | Stop reason: HOSPADM

## 2022-03-11 RX ORDER — SUCCINYLCHOLINE CHLORIDE 100 MG/5ML
SYRINGE (ML) INTRAVENOUS AS NEEDED
Status: DISCONTINUED | OUTPATIENT
Start: 2022-03-11 | End: 2022-03-11 | Stop reason: HOSPADM

## 2022-03-11 RX ORDER — KETAMINE HYDROCHLORIDE 10 MG/ML
INJECTION, SOLUTION INTRAMUSCULAR; INTRAVENOUS AS NEEDED
Status: DISCONTINUED | OUTPATIENT
Start: 2022-03-11 | End: 2022-03-11 | Stop reason: HOSPADM

## 2022-03-11 RX ORDER — FENTANYL CITRATE 50 UG/ML
25 INJECTION, SOLUTION INTRAMUSCULAR; INTRAVENOUS AS NEEDED
Status: DISCONTINUED | OUTPATIENT
Start: 2022-03-11 | End: 2022-03-11 | Stop reason: HOSPADM

## 2022-03-11 RX ORDER — ROCURONIUM BROMIDE 10 MG/ML
INJECTION, SOLUTION INTRAVENOUS AS NEEDED
Status: DISCONTINUED | OUTPATIENT
Start: 2022-03-11 | End: 2022-03-11 | Stop reason: HOSPADM

## 2022-03-11 RX ORDER — SODIUM CHLORIDE 0.9 % (FLUSH) 0.9 %
5-40 SYRINGE (ML) INJECTION EVERY 8 HOURS
Status: DISCONTINUED | OUTPATIENT
Start: 2022-03-11 | End: 2022-03-11 | Stop reason: HOSPADM

## 2022-03-11 RX ORDER — SODIUM CHLORIDE, SODIUM LACTATE, POTASSIUM CHLORIDE, CALCIUM CHLORIDE 600; 310; 30; 20 MG/100ML; MG/100ML; MG/100ML; MG/100ML
75 INJECTION, SOLUTION INTRAVENOUS CONTINUOUS
Status: DISCONTINUED | OUTPATIENT
Start: 2022-03-11 | End: 2022-03-11 | Stop reason: HOSPADM

## 2022-03-11 RX ORDER — ALBUTEROL SULFATE 0.83 MG/ML
2.5 SOLUTION RESPIRATORY (INHALATION)
Status: DISCONTINUED | OUTPATIENT
Start: 2022-03-11 | End: 2022-03-11 | Stop reason: HOSPADM

## 2022-03-11 RX ORDER — PROPOFOL 10 MG/ML
INJECTION, EMULSION INTRAVENOUS AS NEEDED
Status: DISCONTINUED | OUTPATIENT
Start: 2022-03-11 | End: 2022-03-11 | Stop reason: HOSPADM

## 2022-03-11 RX ORDER — PROCHLORPERAZINE EDISYLATE 5 MG/ML
5 INJECTION INTRAMUSCULAR; INTRAVENOUS ONCE
Status: COMPLETED | OUTPATIENT
Start: 2022-03-11 | End: 2022-03-11

## 2022-03-11 RX ORDER — DEXAMETHASONE SODIUM PHOSPHATE 4 MG/ML
INJECTION, SOLUTION INTRA-ARTICULAR; INTRALESIONAL; INTRAMUSCULAR; INTRAVENOUS; SOFT TISSUE AS NEEDED
Status: DISCONTINUED | OUTPATIENT
Start: 2022-03-11 | End: 2022-03-11 | Stop reason: HOSPADM

## 2022-03-11 RX ADMIN — ONDANSETRON HYDROCHLORIDE 4 MG: 2 INJECTION INTRAMUSCULAR; INTRAVENOUS at 15:25

## 2022-03-11 RX ADMIN — FENTANYL CITRATE 25 MCG: 50 INJECTION, SOLUTION INTRAMUSCULAR; INTRAVENOUS at 17:02

## 2022-03-11 RX ADMIN — HYDROMORPHONE HYDROCHLORIDE 0.5 MG: 1 INJECTION, SOLUTION INTRAMUSCULAR; INTRAVENOUS; SUBCUTANEOUS at 16:11

## 2022-03-11 RX ADMIN — PROPOFOL 200 MG: 10 INJECTION, EMULSION INTRAVENOUS at 15:25

## 2022-03-11 RX ADMIN — Medication 2 MG: at 16:06

## 2022-03-11 RX ADMIN — ROCURONIUM BROMIDE 25 MG: 10 INJECTION, SOLUTION INTRAVENOUS at 15:30

## 2022-03-11 RX ADMIN — HYDROMORPHONE HYDROCHLORIDE 0.5 MG: 1 INJECTION, SOLUTION INTRAMUSCULAR; INTRAVENOUS; SUBCUTANEOUS at 15:43

## 2022-03-11 RX ADMIN — Medication 2 G: at 15:19

## 2022-03-11 RX ADMIN — ENOXAPARIN SODIUM 40 MG: 100 INJECTION SUBCUTANEOUS at 13:55

## 2022-03-11 RX ADMIN — FENTANYL CITRATE 100 MCG: 50 INJECTION, SOLUTION INTRAMUSCULAR; INTRAVENOUS at 15:25

## 2022-03-11 RX ADMIN — PROCHLORPERAZINE EDISYLATE 5 MG: 5 INJECTION INTRAMUSCULAR; INTRAVENOUS at 16:51

## 2022-03-11 RX ADMIN — MIDAZOLAM 2 MG: 1 INJECTION INTRAMUSCULAR; INTRAVENOUS at 15:19

## 2022-03-11 RX ADMIN — HYDROMORPHONE HYDROCHLORIDE 0.5 MG: 1 INJECTION, SOLUTION INTRAMUSCULAR; INTRAVENOUS; SUBCUTANEOUS at 16:48

## 2022-03-11 RX ADMIN — Medication 120 MG: at 15:25

## 2022-03-11 RX ADMIN — DEXAMETHASONE SODIUM PHOSPHATE 4 MG: 4 INJECTION, SOLUTION INTRAMUSCULAR; INTRAVENOUS at 15:25

## 2022-03-11 RX ADMIN — LIDOCAINE HYDROCHLORIDE 100 MG: 20 INJECTION, SOLUTION INTRAVENOUS at 15:25

## 2022-03-11 RX ADMIN — GLYCOPYRROLATE 0.1 MG: 0.2 INJECTION INTRAMUSCULAR; INTRAVENOUS at 15:19

## 2022-03-11 RX ADMIN — HYDROMORPHONE HYDROCHLORIDE 0.5 MG: 1 INJECTION, SOLUTION INTRAMUSCULAR; INTRAVENOUS; SUBCUTANEOUS at 16:38

## 2022-03-11 RX ADMIN — FENTANYL CITRATE 25 MCG: 50 INJECTION, SOLUTION INTRAMUSCULAR; INTRAVENOUS at 16:57

## 2022-03-11 RX ADMIN — KETAMINE HYDROCHLORIDE 20 MG: 10 INJECTION, SOLUTION INTRAMUSCULAR; INTRAVENOUS at 15:35

## 2022-03-11 RX ADMIN — SODIUM CHLORIDE, POTASSIUM CHLORIDE, SODIUM LACTATE AND CALCIUM CHLORIDE 75 ML/HR: 600; 310; 30; 20 INJECTION, SOLUTION INTRAVENOUS at 17:04

## 2022-03-11 RX ADMIN — ROCURONIUM BROMIDE 5 MG: 10 INJECTION, SOLUTION INTRAVENOUS at 15:25

## 2022-03-11 RX ADMIN — KETAMINE HYDROCHLORIDE 10 MG: 10 INJECTION, SOLUTION INTRAMUSCULAR; INTRAVENOUS at 15:40

## 2022-03-11 RX ADMIN — SODIUM CHLORIDE, SODIUM LACTATE, POTASSIUM CHLORIDE, AND CALCIUM CHLORIDE: 600; 310; 30; 20 INJECTION, SOLUTION INTRAVENOUS at 15:56

## 2022-03-11 RX ADMIN — SODIUM CHLORIDE, SODIUM LACTATE, POTASSIUM CHLORIDE, AND CALCIUM CHLORIDE 125 ML/HR: 600; 310; 30; 20 INJECTION, SOLUTION INTRAVENOUS at 12:23

## 2022-03-11 RX ADMIN — GLYCOPYRROLATE 0.3 MG: 0.2 INJECTION INTRAMUSCULAR; INTRAVENOUS at 16:06

## 2022-03-11 NOTE — PERIOP NOTES
Reviewed PTA medication list with patient/caregiver and patient/caregiver denies any additional medications. Patient admits to having a responsible adult care for them at home for at least 24 hours after surgery. Patient encouraged to use gown warming system and informed that using said warming gown to regulate body temperature prior to a procedure has been shown to help reduce the risks of blood clots and infection. Patient's pharmacy of choice verified and documented in PTA medication section. Dual skin assessment & fall risk band verification completed with Marielle Gamino RN.

## 2022-03-11 NOTE — PERIOP NOTES
Dr. Fabián Hodge aware of last dose of Lovenox on 3/06/2022, ok to proceed with Lovenox dose today prior to surgery.

## 2022-03-11 NOTE — H&P
Assessment/Plan  #  Detail Type  Description   1. Assessment  Incisional hernia with obstruction, without gangrene (K43.0). Patient Plan  Discussed Dx and options. Will proceed with open procedure. I have discussed the risks benefits and alternatives of the procedure to the patient including bleeding, infection, use of mesh, myofascial release, chronic post op pain, reason and side effects of nerve resections, recurrence . They understand and wish to proceed. 2.  Assessment  Factor V Leiden mutation (D68.51). Patient Plan  Has been on Lovenox post op after her GYN procedure. She will stop for procedure and resume after. 3.  Assessment  Body mass index (BMI) 28.0-28.9, adult (T73.93). Plan Orders  Today's instructions / counseling include(s) Lifestyle education regarding diet. Pain Management Plan  Pain Scale: 0/10. Method: Numeric Pain Intensity Scale. This 30year old female presents for Hernia. History of Present Illness  1. Hernia   , abdominal scar and periumbilical  The patient describes it as aching, dull and sharp. Context includes physical activity. Identified risk factors include previous abdominal surgery. Additional information: Trocar incisional hernia after recent robotic GYN procedure. .        Problem List  Problem List reviewed.    Problem Description  Onset Date  Chronic  Clinical Status  Notes  Heterozygous Factor V Leiden mutation  2021  N          Past Medical/Surgical History   (Detailed)  Disease/disorder  Onset Date  Management  Date  Comments  Pregnancy-full term  [id number]   - Full term  [id number]    Pregnancy-missed   [id number]  Missed  - D&C  [id number]    Pregnancy-full term  [id number]   - Full term  [id number]    Menorrhagia    LSH < 250 grams, bilateral salpingectomy  2022  NICKOLAS 2022 -  Cholecystitis    Cholecystectomy  2014        multiple surgies for endometriosis      Endometriosis          factor V          MTHFR-blood disorder          hx of blood clots (left leg)          Clotting disorder              Family History   (Detailed)    Relationship  Family Member Name    Age at Death  Condition  Onset Age  Cause of Death  Father        Hypertension    N  Maternal grandfather        Leukemia    N  Maternal grandmother        Hypertension    N  Maternal grandmother        Heart disease    N  Maternal grandmother        Renal disease    N  Maternal grandmother        Diabetes mellitus    N  Mother        Hypertension    N  Paternal grandfather        COPD    N  Paternal grandfather        Anxiety    N  Paternal grandfather        Asthma    N  Paternal grandmother        Hypertension    N  Paternal grandmother        Diabetes mellitus    N    Social History  (Detailed)  Tobacco use reviewed. Preferred language is [place]. The patient does not need an . Marital Status/Family/Social Support  Marital status:     Children  Has children:    Tobacco use status: Ex-cigarette smoker. Smoking status: Never smoker. Tobacco Screening  Patient has never used tobacco. Patient has not used tobacco in the last 30 days. Patient has not used smokeless tobacco in the last 30 days. Smoking Status  Type  Smoking Status  Usage Per Day  Years Used  Pack Years  Total Pack Years    Never smoker            Tobacco Cessation Information  Date  Counseled By  Order  Status  Description  Code  Tobacco Cessation Information  2014  Tan Biggs  Tobacco cessation counseling  completed      Tobacco cessation counseling      Tobacco/Vaping Exposure  No passive vaping exposure. No passive smoke exposure. Alcohol  There is no history of alcohol use. Caffeine  The patient uses caffeine: coffee. Alevism/Spiritual  Patient agrees to transfusion. Home Environment/Safety  Uses seat belts.        Medications (active prior to today)  Medication  Instructions  Start Date  Stop Date  Refilled  Elsewhere  multivitamin tablet  take 1 tablet by oral route  every day with food  07/13/2017      N  Zoloft 50 mg tablet  take 1 tablet by oral route  every day  11/11/2021 11/11/2021  N  AcipHex 20 mg tablet,delayed release  TAKE ONE TABLET BY MOUTH DAILY  11/23/2021 11/23/2021  N  Lovenox 40 mg/0.4 mL subcutaneous syringe  inject 0.4 milliliter by subcutaneous route  every day 12 hours after surgery for DVT prophylaxis  01/25/2022      N    Patient Status   Completed with information received for patient transitioning into care. Medication Reconciliation  Medications reconciled today. Medication Reviewed  Adherence  Medication Name  Sig Desc  Elsewhere  Status  taking as directed  Zoloft 50 mg tablet  take 1 tablet by oral route  every day  N  Verified  taking as directed  Lovenox 40 mg/0.4 mL subcutaneous syringe  inject 0.4 milliliter by subcutaneous route  every day 12 hours after surgery for DVT prophylaxis  N  Verified  taking as directed  AcipHex 20 mg tablet,delayed release  TAKE ONE TABLET BY MOUTH DAILY  N  Verified  taking as directed  multivitamin tablet  take 1 tablet by oral route  every day with food  N  Verified    Allergies  Ingredient  Reaction (Severity)  Medication Name  Comment  NO KNOWN ALLERGIES          Reviewed, no changes. Review of Systems  System  Neg/Pos  Details  Constitutional  Negative  Fever, Night sweats and Weight loss. ENMT  Negative  Hearing loss, Tinnitus, Vertigo and Voice change. Eyes  Negative  Diplopia and Vision loss. Respiratory  Negative  Asthma, Cough, Dyspnea, Hemoptysis, Known TB exposure and Wheezing. Cardio  Negative  Chest pain, Claudication, Edema, Irregular heartbeat/palpitations and Thrombophlebitis. GI  Positive  Heartburn. GI  Negative  Bloating, Dysphagia, Hemorrhoids, Jaundice and Reflux.     Negative  Dysuria, Nocturia, Passage stone/gravel and Urinary incontinence. Endocrine  Negative  Cold intolerance and Goiter. Neuro  Positive  Dizziness. Neuro  Negative  Focal weakness, Headache, Paresthesia, Seizures and Syncope. Integumentary  Negative  Change in shape/size of mole(s) and Skin lesion. MS  Negative  Back pain, Bone/joint symptoms and Muscle weakness. Hema/Lymph  Positive  Easy bleeding, Blood clots. Hema/Lymph  Negative  Easy bruising. Allergic/Immuno  Negative  Contact allergy and Contact dermatitis. Vital Signs  Height  Time  ft  in  cm  Last Measured  Height Position  1:09 PM  5.0  6.00  167.64  03/08/2022  Standing    Weight/BSA/BMI  Time  lb  oz  kg  Context  BMI kg/m2  BSA m2  1:09 PM  174.40    79.107  dressed with shoes  28.15      Blood Pressure  Time  BP mm/Hg  Position  Side  Site  Method  Cuff Size  1:09 PM  128/82  sitting  left  arm  manual  adult    Temperature/Pulse/Respiration  Time  Temp F  Temp C  Temp Site  Pulse/min  Pattern  Resp/ min  1:09 PM        105  regular      Pulse Oximetry/FIO2  Time  Pulse Ox (Rest %)  Pulse Ox (Amb %)  O2 Sat  O2 L/Min  Timing  FiO2 %  L/min  Delivery Method  Finger Probe  1:09 PM  99    RA            R Index    Pain Scale  Time  Pain Score  Method  1:09 PM  0/10  Numeric Pain Intensity Scale    Measured by  Time  Measured by  1:09 PM  Genetvanessa Roche    Physical Exam  Exam  Findings  Details  Constitutional  Normal  Well developed. Eyes  Normal  Conjunctiva - Right: Normal, Left: Normal. Pupil - Right: Normal, Left: Normal.  Neck Exam  Normal  Inspection - Normal.  Respiratory  Normal  Inspection - Normal. Auscultation - Normal. Effort - Normal.  Cardiovascular  Normal  Regular rate and rhythm. No murmurs, gallops, or rubs. Vascular  Normal  Capillary refill - Less than 2 seconds. Abdomen  *  Abdominal tenderness is present. Hernia - Positive. Type: incisional. Location: central. Features: tender. Non reducible. supra umbilical trocar incision from mini gel port.   Abdomen  Normal  Inspection - Normal. No hepatic enlargement. No spleen enlargement. Non reducible  Skin  Normal  Inspection - Normal.  Musculoskeletal  Normal  Visual overview of all four extremities is normal.  Extremity  Normal  No edema. Neurological  Normal  Memory - Normal. Cranial nerves - Cranial nerves II through XII grossly intact. Psychiatric  Normal  Orientation - Oriented to time, place, person & situation. Appropriate mood and affect. Normal insight. Normal judgment. Immunizations Entered by History  Date  Immunization  11/1/2020 12:00:00 AM  influenza virus vaccine, split virus (incl. purified surface antigen)-retired CODE  1/29/2021 12:00:00 AM  SARS-COV-2 (COVID-19) vaccine, mRNA, spike protein, LNP, preservative free, 30 mcg/0.3mL dose  1/8/2021 12:00:00 AM  SARS-COV-2 (COVID-19) vaccine, mRNA, spike protein, LNP, preservative free, 30 mcg/0.3mL dose  12/1/2016 12:00:00 AM  Flu (3 yrs or older)        Medications (added, continued, or stopped this visit)  Start Date  Medication  Directions  PRN Status  PRN Reason  Instruction  Stop Date  11/23/2021  AcipHex 20 mg tablet,delayed release  TAKE ONE TABLET BY MOUTH DAILY  N        01/25/2022  Lovenox 40 mg/0.4 mL subcutaneous syringe  inject 0.4 milliliter by subcutaneous route  every day 12 hours after surgery for DVT prophylaxis  N        07/13/2017  multivitamin tablet  take 1 tablet by oral route  every day with food  N        11/11/2021  Zoloft 50 mg tablet  take 1 tablet by oral route  every day  N          Active Patient Care Team Members  Name  Contact  Agency Type  Support Role  Relationship  Active Date  Inactive Date  [facility]      Patient provider  PCP      Internal Medicine  Marleni Francis        Mother            Provider:    Cori Travis MD 03/08/2022 1:27 PM    Document generated by: Reji Thomas 03/08/2022 01:27 PM         Electronically signed by Bimal Mejia NP on 03/10/2022 02:28 PM  on behalf of Reji Thomas MD

## 2022-03-11 NOTE — ANESTHESIA PREPROCEDURE EVALUATION
Relevant Problems   No relevant active problems       Anesthetic History     PONV          Review of Systems / Medical History  Patient summary reviewed and pertinent labs reviewed    Pulmonary                   Neuro/Psych         Psychiatric history     Cardiovascular    Hypertension: well controlled              Exercise tolerance: >4 METS     GI/Hepatic/Renal     GERD: well controlled           Endo/Other             Other Findings   Comments: Does not want Scopolamine patch due to excessive dry mouth           Physical Exam    Airway  Mallampati: I  TM Distance: > 6 cm  Neck ROM: normal range of motion   Mouth opening: Normal     Cardiovascular  Regular rate and rhythm,  S1 and S2 normal,  no murmur, click, rub, or gallop  Rhythm: regular  Rate: normal         Dental  No notable dental hx       Pulmonary  Breath sounds clear to auscultation               Abdominal  GI exam deferred       Other Findings            Anesthetic Plan    ASA: 2, emergent  Anesthesia type: general          Induction: RSI and Intravenous  Anesthetic plan and risks discussed with: Patient

## 2022-03-11 NOTE — ANESTHESIA POSTPROCEDURE EVALUATION
Procedure(s):  OPEN REPAIR INCARCERATED INCISIONAL HERNIA \"SPEC POP\". general    Anesthesia Post Evaluation      Multimodal analgesia: multimodal analgesia used between 6 hours prior to anesthesia start to PACU discharge  Patient location during evaluation: PACU  Patient participation: complete - patient participated  Level of consciousness: awake and alert  Pain score: 4  Airway patency: patent  Anesthetic complications: no  Cardiovascular status: acceptable  Respiratory status: acceptable  Hydration status: acceptable  Post anesthesia nausea and vomiting:  none  Final Post Anesthesia Temperature Assessment:  Normothermia (36.0-37.5 degrees C)      INITIAL Post-op Vital signs:   Vitals Value Taken Time   /68 03/11/22 1705   Temp 36.9 °C (98.5 °F) 03/11/22 1632   Pulse 106 03/11/22 1712   Resp 21 03/11/22 1712   SpO2 96 % 03/11/22 1712   Vitals shown include unvalidated device data.

## 2022-03-11 NOTE — BRIEF OP NOTE
Brief Postoperative Note    Patient: Mary Lou Hyde  YOB: 1985  MRN: [id number]    Date of Procedure: 3/11/2022     Pre-Op Diagnosis: INCARCERATED INCISIONAL HERNIA    Post-Op Diagnosis: Same as preoperative diagnosis. Procedure(s):  OPEN REPAIR INCARCERATED INCISIONAL HERNIA \"SPEC POP\"    Surgeon(s): Lilia Rose MD    Surgical Assistant: Surg Asst-1: Chika Quiles    Anesthesia: General     Estimated Blood Loss (mL): Minimal    Complications: None    Specimens: * No specimens in log *     Implants:   Implant Name Type Inv.  Item Serial No.  Lot No. LRB No. Used Action   MESH COMP VENT PATCH 8.6CM -- PARIETEX - [id number]  MESH COMP VENT PATCH 8.6CM -- PARIETEX  COIVIDIEN VASCULAR [id number] N/A 1 Implanted       Drains: * No LDAs found *    Findings: hernia      Electronically Signed by Edward Steele MD on 3/11/2022 at 4:07 PM

## 2022-03-11 NOTE — PERIOP NOTES
Notified Dr. Glynn Branch that patient has factor V clotting disorder. Received orders to administer 40mg Lovenox sq just prior to procedure.

## 2022-03-11 NOTE — DISCHARGE INSTRUCTIONS
Post-Operative Discharge Instructions  Maryanne Cochran. Isabella Pollard M.D.  [address] [place] [address], Naveen Rider Robe  [phone]    Patient: Mary Curry MRN: [id number]  CSN: [id number]    YOB: 1985  Age: 30 y.o. Sex: female    DOA: 3/11/2022 LOS:  LOS: 0 days   Discharge Date:      Acute Diagnoses:  INCARCERATED INCISIONAL HERNIA    Chronic Medical Diagnoses:  Problem List as of 3/11/2022 Date Reviewed: 2/25/2022          Codes Class Noted - Resolved    Postpartum care following vaginal delivery ICD-10-CM: Z39.2  ICD-9-CM: V24.2  2/26/2019 - Present        Anemia ICD-10-CM: D64.9  ICD-9-CM: 285.9  2/26/2019 - Present        History of DVT (deep vein thrombosis) ICD-10-CM: Z86.718  ICD-9-CM: V12.51  2/26/2019 - Present        MTHFR mutation ICD-10-CM: Z15.89  ICD-9-CM: V84.89  2/26/2019 - Present        Gastroesophageal reflux disease ICD-10-CM: K21.9  ICD-9-CM: 530.81  4/25/2018 - Present        RESOLVED: Coagulation defect complicating pregnancy ([facility] [address] ICD-10-CM: O99.119, D68.9  ICD-9-CM: 649.30, 286.9  2/25/2019 - 2/26/2019        RESOLVED: Term pregnancy ICD-10-CM: Z34.90  ICD-9-CM: V22.1  2/25/2019 - 2/26/2019        RESOLVED: 38 weeks gestation of pregnancy ICD-10-CM: Z3A.38  ICD-9-CM: V22.2  2/21/2019 - 2/26/2019              Diet  1. Resume prior to surgery diet as tolerated. Activity  1. Do not drive a car or operate any hazardous machinery the day of surgery. 2. Rest quietly today. 3. No bending or heavy lifting. 4. You may resume other prior to surgery activities as tolerated. 5. You may remove the bandage and shower in 1 day. Drain / Wound Care  1. Follow all drain / wound care instructions exactly as explained by the Nurse at time of discharge. 2. Apply an ice pack to the surgical site for 48 hours. 3. Do not put any salves or ointments on the wound. Allow it to form a dry scab. 4. Leave steri-strips / Dermabond alone.   They should be allowed to fall off on their own in 7-14 days. Medications  1. It is important to take your medications exactly as they are prescribed. 2. Keep your medication in the bottles provided by the pharmacist, and keep a list of the medication names, dosages, and times they should be taken in your wallet. Call 911 anytime you think you may need emergency care. For example, call if:  · You passed out (lost consciousness). · You have severe trouble breathing. · You have sudden chest pain and shortness of breath. Notify your Surgeon for any of the followin. Fever, chills, nausea, vomiting, severe abdominal pain or bleeding. 2. If you experience any redness or discharge or sign of infection. 3. Persistent nausea lasting more than 24 hours. If you are unable to reach your Surgeon for any of the symptoms above, you should proceed directly to the nearest Emergency Department. Post-Operative Appointment Information    Call Dr. Tracie Beach office tomorrow morning at [phone] to schedule a post-operative office visit in one (1) week. If any questions or concerns arise, call your Surgeon at [phone]. Patient armband removed and shredded    DISCHARGE SUMMARY from Nurse    PATIENT INSTRUCTIONS:    After general anesthesia or intravenous sedation, for 24 hours or while taking prescription Narcotics:  · Limit your activities  · Do not drive and operate hazardous machinery  · Do not make important personal or business decisions  · Do  not drink alcoholic beverages  · If you have not urinated within 8 hours after discharge, please contact your surgeon on call.     Report the following to your surgeon:  · Excessive pain, swelling, redness or odor of or around the surgical area  · Temperature over 100.5  · Nausea and vomiting lasting longer than 4 hours or if unable to take medications  · Any signs of decreased circulation or nerve impairment to extremity: change in color, persistent  numbness, tingling, coldness or increase pain  · Any questions    What to do at Home:  Recommended activity: Activity as tolerated and no driving for today, No driving while on analgesics and See surgical instructions    If you experience any of the following symptoms Fever, chills, pain uncontrolled by prescribed pain meds, odorous drainage, redness and or swelling at incision site  , please follow up with Dr. May Davila. *  Please give a list of your current medications to your Primary Care Provider. *  Please update this list whenever your medications are discontinued, doses are      changed, or new medications (including over-the-counter products) are added. *  Please carry medication information at all times in case of emergency situations. These are general instructions for a healthy lifestyle:    No smoking/ No tobacco products/ Avoid exposure to second hand smoke  Surgeon General's Warning:  Quitting smoking now greatly reduces serious risk to your health. Obesity, smoking, and sedentary lifestyle greatly increases your risk for illness    A healthy diet, regular physical exercise & weight monitoring are important for maintaining a healthy lifestyle    You may be retaining fluid if you have a history of heart failure or if you experience any of the following symptoms:  Weight gain of 3 pounds or more overnight or 5 pounds in a week, increased swelling in our hands or feet or shortness of breath while lying flat in bed. Please call your doctor as soon as you notice any of these symptoms; do not wait until your next office visit. The discharge information has been reviewed with the patient and caregiver. The patient and caregiver verbalized understanding.   Discharge medications reviewed with the patient and caregiver and appropriate educational materials and side effects teaching were provided.   ___________________________________________________________________________________________________________________________________

## 2022-03-14 NOTE — OP NOTES
[facility]  OPERATIVE REPORT    Name:  Ita Rivera  MR#:   [id number]  :  1985  ACCOUNT #:  [de-identified]  DATE OF SERVICE:  2022    PREOPERATIVE DIAGNOSIS:  Incarcerated incisional hernia. POSTOPERATIVE DIAGNOSIS:  Incarcerated incisional hernia. PROCEDURE PERFORMED:  1. Open repair of incarcerated incisional hernia with mesh. 2.  Myofascial release. SURGEON:  Ellen Hernandes MD    ASSISTANT:  None. ANESTHESIA:  General endotracheal.    COMPLICATIONS:  None. SPECIMENS REMOVED:  None. IMPLANTS:  None. ESTIMATED BLOOD LOSS:  Minimal.    INDICATIONS:  The patient presents with an incarcerated incisional hernia from a GelPort site used in a GYN procedure approximately two weeks ago. She now presents with incarcerated hernia seen on CT scan. I have discussed risks, benefits, and alternatives to her. She understands and wishes to proceed. PROCEDURE:  She was placed in a supine position. Her abdomen was prepped and draped in the usual fashion. An incision was made above the umbilicus and a vertical incision at the previous GelPort site incision. Immediately, the patient was found to have an incarcerated incisional hernia with small bowel and omentum. The bowel was viable. There were some minor adhesions superiorly, which were easily lysed with blunt dissection and Metzenbaum scissors. Next, the bowel and omentum were returned into the abdominal cavity. There was a lot of inflammation from the previous surgery, and to allow adequate primary closure of the anterior fascia, I had to do a limited myofascial release on the left side of the abdominal wall using the electrocautery. This was an anterior release near the hernia defect. Once this was completed, an 8 cm Medtronic PCO ventral patch was placed through the defect intraabdominally and sutured to the abdominal wall using 2-0 PDS interrupted sutures in four quadrants.   Once the mesh was in place, the anterior fascia was closed utilizing the myofascial release with as little tension as possible using a #1 Prolene continuous suture. There was good hemostasis. Exparel was injected locally. The subcutaneous tissues were closed in layers using 2-0 and 3-0 Vicryl suture. The skin was closed with 4-0 Monocryl subcuticular closure and Dermabond.       Terence Lane MD      [id number]/  D:  03/13/2022 17:23  T:  03/14/2022 1:37  JOB #:  [id number]

## 2024-06-11 NOTE — ROUTINE PROCESS
Can you please advise, thanks.    Time spent with patient reviewing discharge instructions to include the following information that was also provided in written form to the patient; normal vaginal bleeding to expect how to care for perineum and how to respond should she experience an increase in vaginal bleeding. Patient instructed that she should not lift more than the weight of her baby for at least a week (2 weeks if she delivered by  section). She was encouraged to stay hydrated and informed that she should not have sex, douche, use tampons,  or place anything in the vagina until her postpartum visit. Additionally she was instructed not to use tub baths, hot tubs or pools until cleared by her midwife or physician. Patient was informed that some swelling of her legs can be expected after delivery and to elevate them whenever possible. If the swelling increases or she has signs of calf pain/tenderness, or redness that is present in one leg more than the other she is to notify her provider or present in the emergency department for evaluation if unable to reach provider. Patients having delivered by  section were instructed not to drive for the first 2 weeks, to only go up and down stairs 1 time in a day for 2 weeks as this will increase her risk for the incision to open. She was also instructed not to scrub the incision but to allow soap and water to fall onto it and to pat dry. Patient was given signs and symptoms of infection and instructed to call provider with temperature equal to or > than 100.4, foul smelling blood or discharge from the vagina, and increase in redness or discharge from incisions or an open wound that is not healing.   Patient was also instructed on the signs of postpartum depression and instructed that if she is considering harming herself or her infant, feels out of control, unable to care for herself or baby, feels sad or depressed most of the day every day, is having trouble sleeping or sleeping too much or is having trouble bonding with her baby she is to call her provider or present in the emergency department. Patient was also provided with signs and symptoms of a pulmonary embolism (PE). She was told what a PE is and instructed to call 911 if she experiences SOB (fast, shallow, rapid respirations) at rest, chest pain that worsens when coughing or change in her level of consciousness. Patient was informed that she might experience blood pressure changes during the postpartum weeks and that she should contact her provider with any severe, constant headaches that do not respond to over-the-counter pain medication, rest and/or hydration. She is also to call her provider with any changes in vision, seeing spots, or flashing lights, pain in the upper right quadrant of the abdomen, swelling of the face, hands, and/or legs more than what is expected or a change in her level of consciousness. Patient was strongly encouraged to keep her postpartum appointment and emphasized the importance of telling all providers her delivery date up until one year after the birth of her baby. Date of postpartum visit was confirmed prior to delivery. All questions were answered and patient voiced understanding of the information provided.

## (undated) DEVICE — VISUALIZATION SYSTEM: Brand: CLEARIFY

## (undated) DEVICE — SUT MONOCRYL PLUS UD 4-0 --

## (undated) DEVICE — TISSUE RETRIEVAL SYSTEM: Brand: INZII RETRIEVAL SYSTEM

## (undated) DEVICE — INTENDED FOR TISSUE SEPARATION, AND OTHER PROCEDURES THAT REQUIRE A SHARP SURGICAL BLADE TO PUNCTURE OR CUT.: Brand: BARD-PARKER ® CARBON RIB-BACK BLADES

## (undated) DEVICE — SOL IRRIGATION INJ NACL 0.9% 500ML BTL

## (undated) DEVICE — TOWEL,OR,DSP,ST,BLUE,STD,4/PK,20PK/CS: Brand: MEDLINE

## (undated) DEVICE — TOTAL TRAY, DB, 100% SILI FOLEY, 16FR 10: Brand: MEDLINE

## (undated) DEVICE — PAD PT POS 36 IN SURGYPAD DISP

## (undated) DEVICE — MINOR: Brand: MEDLINE INDUSTRIES, INC.

## (undated) DEVICE — SEAL UNIV 5-8MM DISP BX/10 -- DA VINCI XI - SNGL USE

## (undated) DEVICE — ACCESS PLATFORM FOR MINIMALLY INVASIVE SURGERY: Brand: GELPOINT®  MINI ADVANCED ACCESS PLATFORM

## (undated) DEVICE — GARMENT,MEDLINE,DVT,INT,CALF,MED, GEN2: Brand: MEDLINE

## (undated) DEVICE — DERMABOND SKIN ADH 0.7ML -- DERMABOND ADVANCED 12/BX

## (undated) DEVICE — SUT MONOCRYL PLUS UD 3-0 --

## (undated) DEVICE — SYR LR LCK 1ML GRAD NSAF 30ML --

## (undated) DEVICE — SOLUTION LACTATED RINGERS INJECTION USP

## (undated) DEVICE — REM POLYHESIVE ADULT PATIENT RETURN ELECTRODE: Brand: VALLEYLAB

## (undated) DEVICE — ARM DRAPE

## (undated) DEVICE — GLOVE SURG SZ 8 L12IN FNGR THK79MIL GRN LTX FREE

## (undated) DEVICE — DERMABOND SKIN ADH 0.7ML --

## (undated) DEVICE — DRAPE XR C ARM 41X74IN LF --

## (undated) DEVICE — GLOVE ORANGE PI 7 1/2   MSG9075

## (undated) DEVICE — GLOVE SURG SZ 9 THK91MIL LTX FREE SYN POLYISOPRENE ANTI

## (undated) DEVICE — TRI-LUMEN FILTERED TUBE SET WITH ACTIVATED CHARCOAL FILTER: Brand: AIRSEAL

## (undated) DEVICE — SUTURE PDS II SZ 2-0 L27IN ABSRB VLT L26MM CT-2 1/2 CIR Z333H

## (undated) DEVICE — SUT VCRL + 0 36IN UR6 VIO --

## (undated) DEVICE — SUTURE VCRL SZ 3-0 L27IN ABSRB UD L26MM SH 1/2 CIR J416H

## (undated) DEVICE — COVER MPLR TIP CRV SCIS ACC DA VINCI

## (undated) DEVICE — COLUMN DRAPE

## (undated) DEVICE — NEEDLE,HYPODERM,SAFETY,21GX1.5": Brand: MEDLINE

## (undated) DEVICE — CYSTO/BLADDER IRRIGATION SET, REGULATING CLAMP

## (undated) DEVICE — SUTURE PDS + SZ 1 L96IN ABSRB VLT L65MM TP-1 1/2 CIR PDP880G

## (undated) DEVICE — PREP SKN CHLRAPRP APL 26ML STR --

## (undated) DEVICE — BLADELESS OBTURATOR: Brand: WECK VISTA

## (undated) DEVICE — BINDER PREMIER PRO ABD 10INCH UNIV LTX FREE

## (undated) DEVICE — 3-0 COATED VICRYL PLUS UNDYED 1X27" SH --

## (undated) DEVICE — ROBOTIC PACK: Brand: MEDLINE INDUSTRIES, INC.

## (undated) DEVICE — AIRSEAL 5 MM ACCESS PORT AND LOW PROFILE OBTURATOR WITH BLADELESS OPTICAL TIP, 120 MM LENGTH: Brand: AIRSEAL

## (undated) DEVICE — GLOVE ORANGE PI 8 1/2   MSG9085

## (undated) DEVICE — SOLUTION IRRIGATION H2O 0797305] ICU MEDICAL INC]

## (undated) DEVICE — SUT PROL 1 30IN CT1 BLU --

## (undated) DEVICE — DISPOSABLE SUCTION/IRRIGATOR TUBE SET WITH TIP: Brand: AHTO